# Patient Record
Sex: FEMALE | Race: BLACK OR AFRICAN AMERICAN | NOT HISPANIC OR LATINO | Employment: FULL TIME | URBAN - METROPOLITAN AREA
[De-identification: names, ages, dates, MRNs, and addresses within clinical notes are randomized per-mention and may not be internally consistent; named-entity substitution may affect disease eponyms.]

---

## 2018-07-04 ENCOUNTER — OFFICE VISIT (OUTPATIENT)
Dept: URGENT CARE | Age: 23
End: 2018-07-04
Payer: COMMERCIAL

## 2018-07-04 VITALS
WEIGHT: 277 LBS | TEMPERATURE: 98.2 F | DIASTOLIC BLOOD PRESSURE: 85 MMHG | SYSTOLIC BLOOD PRESSURE: 136 MMHG | HEIGHT: 71 IN | OXYGEN SATURATION: 96 % | RESPIRATION RATE: 16 BRPM | HEART RATE: 87 BPM | BODY MASS INDEX: 38.78 KG/M2

## 2018-07-04 DIAGNOSIS — H57.13 EYE PAIN, BILATERAL: ICD-10-CM

## 2018-07-04 DIAGNOSIS — S05.02XA BILATERAL CORNEAL ABRASIONS, INITIAL ENCOUNTER: Primary | ICD-10-CM

## 2018-07-04 DIAGNOSIS — S05.01XA BILATERAL CORNEAL ABRASIONS, INITIAL ENCOUNTER: Primary | ICD-10-CM

## 2018-07-04 PROCEDURE — 99213 OFFICE O/P EST LOW 20 MIN: CPT | Performed by: PHYSICIAN ASSISTANT

## 2018-07-04 RX ORDER — PROPARACAINE HYDROCHLORIDE 5 MG/ML
1 SOLUTION/ DROPS OPHTHALMIC ONCE
Status: COMPLETED | OUTPATIENT
Start: 2018-07-04 | End: 2018-07-04

## 2018-07-04 RX ORDER — ALBUTEROL SULFATE 90 UG/1
2 AEROSOL, METERED RESPIRATORY (INHALATION) EVERY 4 HOURS PRN
COMMUNITY
Start: 2011-10-18 | End: 2021-08-07 | Stop reason: SDUPTHER

## 2018-07-04 RX ORDER — MONTELUKAST SODIUM 10 MG/1
1 TABLET ORAL
COMMUNITY
Start: 2011-10-11 | End: 2021-08-07 | Stop reason: SDUPTHER

## 2018-07-04 RX ORDER — LORATADINE 10 MG/1
1 TABLET ORAL
COMMUNITY
Start: 2014-03-17

## 2018-07-04 RX ORDER — ALBUTEROL SULFATE 0.63 MG/3ML
1 SOLUTION RESPIRATORY (INHALATION) EVERY 6 HOURS
COMMUNITY

## 2018-07-04 RX ORDER — OMEPRAZOLE 40 MG/1
40 CAPSULE, DELAYED RELEASE ORAL
COMMUNITY

## 2018-07-04 RX ADMIN — PROPARACAINE HYDROCHLORIDE 1 DROP: 5 SOLUTION/ DROPS OPHTHALMIC at 15:50

## 2018-07-04 NOTE — PATIENT INSTRUCTIONS
Use erythromycin eye ointment as prescribed   Apply cold compresses  Do not wear contacts during treatment  Avoid touching eyes  Wash hands frequently  Follow up with ophthalmologist if symptoms do not resolve  Follow up with PCP in 3-5 days  Proceed to  ER if symptoms worsen  Corneal Abrasion   WHAT YOU NEED TO KNOW:   A corneal abrasion is a scratch on the cornea of your eye  The cornea is the clear layer that covers the front of your eye  A small scratch may heal in 1 to 2 days  Deeper or larger scratches may take longer to heal         DISCHARGE INSTRUCTIONS:   Contact your healthcare provider if:   · Your eye pain or vision gets worse  · You have yellow or green drainage from your eye  · You have questions or concerns about your condition or care  Medicines:   · Medicines  may be given in the form of eyedrops or ointment to help prevent an eye infection  You may also be given eye drops to decrease pain  Ask how to take this medicine safely  · Take your medicine as directed  Contact your healthcare provider if you think your medicine is not helping or if you have side effects  Tell him or her if you are allergic to any medicine  Keep a list of the medicines, vitamins, and herbs you take  Include the amounts, and when and why you take them  Bring the list or the pill bottles to follow-up visits  Carry your medicine list with you in case of an emergency  Follow up with your healthcare provider as directed:  Write down your questions so you remember to ask them during your visits  Self-care:   · Do not touch or rub your eye  · Ask your healthcare provider when you can start your normal activities  · Ask your healthcare provider when you can wear your contact lenses  · Wear sunglasses in bright light until your eyes feel better  Help prevent corneal abrasions:   · Remove your contact lenses if your eyes feel dry or irritated       · Wash your hands if you need to touch your eyes or your face  · Trim your child's fingernails so he cannot scratch his eye  · Wear protective eyewear when you work with chemicals, wood, dust, or metal      · Wear protective eyewear when you play sports  · Do not wear your contacts for longer than you should  · Do not wear colored lenses or lenses with shapes on them  These lenses may cause eye damage and vision loss  · Do not wear glitter makeup  Glitter can easily get into your eyes and under contact lenses  · Do not sleep with your contacts in your eyes  © 2017 [facility] Information is for End User's use only and may not be sold, redistributed or otherwise used for commercial purposes  All illustrations and images included in CareNotes® are the copyrighted property of [employer]  or Leobardo Aguilera  The above information is an  only  It is not intended as medical advice for individual conditions or treatments  Talk to your doctor, nurse or pharmacist before following any medical regimen to see if it is safe and effective for you

## 2018-07-04 NOTE — PROGRESS NOTES
[facility]        NAME: Hanna Pro is a 26 y o  female  : 1995    MRN: [id number]  DATE: 2018  TIME: 4:10 PM    Assessment and Plan   Bilateral corneal abrasions, initial encounter [S05  [address], [place]  1  Bilateral corneal abrasions, initial encounter     2  Eye pain, bilateral  fluorescein sodium sterile 1 mg ophthalmic strip 1 strip    proparacaine (ALCAINE) 0 5 % ophthalmic solution 1 drop         Patient Instructions     Use erythromycin eye ointment as prescribed   Apply cold compresses  Do not wear contacts during treatment  Avoid touching eyes  Wash hands frequently  Follow up with ophthalmologist if symptoms do not resolve  Follow up with PCP in 3-5 days  Proceed to  ER if symptoms worsen  Chief Complaint     Chief Complaint   Patient presents with    Eye Problem     this morning woke up with eye discharge and pain, eyedrops made it worse, also tried eyewash at work         History of Present Illness       PMH of allergies  Wears contacts but is not currently wearing  States she feels like there is "sand in my eye" but she knows there is nothing in there stating "the only thing I can think of is my eye makeup"  Eye Pain    Both eyes are affected  This is a new problem  The current episode started today  The problem occurs constantly  The problem has been gradually worsening  The pain is at a severity of 8/10  There is no known exposure to pink eye  She wears contacts  Associated symptoms include blurred vision, eye redness, a foreign body sensation and photophobia  Pertinent negatives include no eye discharge, double vision, fever, itching, nausea, recent URI or vomiting  She has tried eye drops and water for the symptoms  The treatment provided no relief  Review of Systems   Review of Systems   Constitutional: Negative for activity change, appetite change and fever  Eyes: Positive for blurred vision, photophobia, pain, redness and visual disturbance  Negative for double vision, discharge and itching  Gastrointestinal: Negative for nausea and vomiting  Neurological: Negative for headaches  Current Medications       Current Outpatient Prescriptions:     albuterol (ACCUNEB) 0 63 MG/3ML nebulizer solution, Inhale 1 ampule every 6 (six) hours, Disp: , Rfl:     albuterol (VENTOLIN HFA) 90 mcg/act inhaler, Inhale 2 puffs every 4 (four) hours as needed, Disp: , Rfl:     fluticasone-salmeterol (ADVAIR DISKUS) 250-50 mcg/dose inhaler, Inhale 1 puff every 12 (twelve) hours, Disp: , Rfl:     loratadine (CLARITIN) 10 mg tablet, Take 1 tablet by mouth, Disp: , Rfl:     montelukast (SINGULAIR) 10 mg tablet, Take 1 tablet by mouth, Disp: , Rfl:     omeprazole (PriLOSEC) 40 MG capsule, Take 40 mg by mouth, Disp: , Rfl:   No current facility-administered medications for this visit  Current Allergies     Allergies as of 07/04/2018 - never reviewed   Allergen Reaction Noted    Prednisone Other (See Comments) 08/26/2016            The following portions of the patient's history were reviewed and updated as appropriate: allergies, current medications, past family history, past medical history, past social history, past surgical history and problem list      History reviewed  No pertinent past medical history  History reviewed  No pertinent surgical history  No family history on file  Medications have been verified  Objective   /85 (BP Location: Right arm, Patient Position: Sitting, Cuff Size: Adult)   Pulse 87   Temp 98 2 °F (36 8 °C) (Tympanic)   Resp 16   Ht 5' 11" (1 803 m)   Wt 126 kg (277 lb)   LMP 06/20/2018 (Approximate)   SpO2 96%   BMI 38 63 kg/m²        Physical Exam     Physical Exam   Constitutional: She appears well-developed and well-nourished  No distress  HENT:   Head: Normocephalic     Right Ear: External ear normal    Left Ear: External ear normal    Nose: Nose normal    Mouth/Throat: Oropharynx is clear and moist  No oropharyngeal exudate  Eyes: Conjunctivae and EOM are normal  Pupils are equal, round, and reactive to light  Right eye exhibits no discharge  Left eye exhibits no discharge  No scleral icterus  Direct visualization without foreign body  Patient tearing throughout exam  OD, OS, OU 20/20  Fluoroscein stain performed with 1cm linear abrasion over R cornea from 3-6 o'clock  L 0 25cm scleral abrasion over 6 o'clock  Lid flipped without direct visualization of any foreign body  Cardiovascular: Normal rate, regular rhythm, normal heart sounds and intact distal pulses  Exam reveals no gallop and no friction rub  No murmur heard  Pulmonary/Chest: Effort normal and breath sounds normal  No respiratory distress  She has no wheezes  She has no rales  She exhibits no tenderness  Lymphadenopathy:     She has no cervical adenopathy  Skin: Skin is warm  She is not diaphoretic  Psychiatric: She has a normal mood and affect  Her behavior is normal  Judgment and thought content normal    Vitals reviewed

## 2019-04-08 ENCOUNTER — APPOINTMENT (EMERGENCY)
Dept: ULTRASOUND IMAGING | Facility: HOSPITAL | Age: 24
End: 2019-04-08
Payer: COMMERCIAL

## 2019-04-08 ENCOUNTER — HOSPITAL ENCOUNTER (EMERGENCY)
Facility: HOSPITAL | Age: 24
Discharge: HOME | End: 2019-04-08
Attending: EMERGENCY MEDICINE | Admitting: EMERGENCY MEDICINE
Payer: COMMERCIAL

## 2019-04-08 VITALS
TEMPERATURE: 97.7 F | HEART RATE: 73 BPM | WEIGHT: 279.98 LBS | BODY MASS INDEX: 39.05 KG/M2 | OXYGEN SATURATION: 100 % | SYSTOLIC BLOOD PRESSURE: 126 MMHG | DIASTOLIC BLOOD PRESSURE: 73 MMHG | RESPIRATION RATE: 18 BRPM

## 2019-04-08 DIAGNOSIS — R10.9 ABDOMINAL PAIN DURING PREGNANCY: ICD-10-CM

## 2019-04-08 DIAGNOSIS — O21.9 VOMITING DURING PREGNANCY: Primary | ICD-10-CM

## 2019-04-08 DIAGNOSIS — O26.899 ABDOMINAL PAIN DURING PREGNANCY: ICD-10-CM

## 2019-04-08 LAB
ANION GAP SERPL CALCULATED.3IONS-SCNC: 12 MMOL/L (ref 4–13)
B-HCG SERPL-ACNC: ABNORMAL MIU/ML
BACTERIA UR QL AUTO: ABNORMAL /HPF
BASOPHILS # BLD AUTO: 0.05 THOUSANDS/ΜL (ref 0–0.1)
BASOPHILS NFR BLD AUTO: 0 % (ref 0–1)
BILIRUB UR QL STRIP: NEGATIVE
BUN SERPL-MCNC: 6 MG/DL (ref 5–25)
CALCIUM SERPL-MCNC: 9.3 MG/DL (ref 8.3–10.1)
CHLORIDE SERPL-SCNC: 100 MMOL/L (ref 100–108)
CLARITY UR: CLEAR
CO2 SERPL-SCNC: 24 MMOL/L (ref 21–32)
COLOR UR: YELLOW
CREAT SERPL-MCNC: 0.66 MG/DL (ref 0.6–1.3)
EOSINOPHIL # BLD AUTO: 0.31 THOUSAND/ΜL (ref 0–0.61)
EOSINOPHIL NFR BLD AUTO: 3 % (ref 0–6)
ERYTHROCYTE [DISTWIDTH] IN BLOOD BY AUTOMATED COUNT: 11.9 % (ref 11.6–15.1)
EXT PREG TEST URINE: POSITIVE
GFR SERPL CREATININE-BSD FRML MDRD: 146 ML/MIN/1.73SQ M
GLUCOSE SERPL-MCNC: 91 MG/DL (ref 65–140)
GLUCOSE UR STRIP-MCNC: NEGATIVE MG/DL
HCT VFR BLD AUTO: 43.4 % (ref 34.8–46.1)
HGB BLD-MCNC: 14.6 G/DL (ref 11.5–15.4)
HGB UR QL STRIP.AUTO: NEGATIVE
IMM GRANULOCYTES # BLD AUTO: 0.04 THOUSAND/UL (ref 0–0.2)
IMM GRANULOCYTES NFR BLD AUTO: 0 % (ref 0–2)
KETONES UR STRIP-MCNC: ABNORMAL MG/DL
LEUKOCYTE ESTERASE UR QL STRIP: ABNORMAL
LYMPHOCYTES # BLD AUTO: 2.19 THOUSANDS/ΜL (ref 0.6–4.47)
LYMPHOCYTES NFR BLD AUTO: 18 % (ref 14–44)
MCH RBC QN AUTO: 31.4 PG (ref 26.8–34.3)
MCHC RBC AUTO-ENTMCNC: 33.6 G/DL (ref 31.4–37.4)
MCV RBC AUTO: 93 FL (ref 82–98)
MONOCYTES # BLD AUTO: 0.5 THOUSAND/ΜL (ref 0.17–1.22)
MONOCYTES NFR BLD AUTO: 4 % (ref 4–12)
NEUTROPHILS # BLD AUTO: 9.07 THOUSANDS/ΜL (ref 1.85–7.62)
NEUTS SEG NFR BLD AUTO: 75 % (ref 43–75)
NITRITE UR QL STRIP: NEGATIVE
NON-SQ EPI CELLS URNS QL MICRO: ABNORMAL /HPF
NRBC BLD AUTO-RTO: 0 /100 WBCS
PH UR STRIP.AUTO: 6.5 [PH] (ref 4.5–8)
PLATELET # BLD AUTO: 224 THOUSANDS/UL (ref 149–390)
PMV BLD AUTO: 10.1 FL (ref 8.9–12.7)
POTASSIUM SERPL-SCNC: 3.6 MMOL/L (ref 3.5–5.3)
PROT UR STRIP-MCNC: NEGATIVE MG/DL
RBC # BLD AUTO: 4.65 MILLION/UL (ref 3.81–5.12)
RBC #/AREA URNS AUTO: ABNORMAL /HPF
SODIUM SERPL-SCNC: 136 MMOL/L (ref 136–145)
SP GR UR STRIP.AUTO: 1.01 (ref 1–1.03)
UROBILINOGEN UR QL STRIP.AUTO: 0.2 E.U./DL
WBC # BLD AUTO: 12.16 THOUSAND/UL (ref 4.31–10.16)
WBC #/AREA URNS AUTO: ABNORMAL /HPF

## 2019-04-08 PROCEDURE — 76801 OB US < 14 WKS SINGLE FETUS: CPT

## 2019-04-08 PROCEDURE — 36415 COLL VENOUS BLD VENIPUNCTURE: CPT | Performed by: EMERGENCY MEDICINE

## 2019-04-08 PROCEDURE — 84702 CHORIONIC GONADOTROPIN TEST: CPT | Performed by: EMERGENCY MEDICINE

## 2019-04-08 PROCEDURE — 80048 BASIC METABOLIC PNL TOTAL CA: CPT | Performed by: EMERGENCY MEDICINE

## 2019-04-08 PROCEDURE — 96361 HYDRATE IV INFUSION ADD-ON: CPT

## 2019-04-08 PROCEDURE — 99284 EMERGENCY DEPT VISIT MOD MDM: CPT

## 2019-04-08 PROCEDURE — 81001 URINALYSIS AUTO W/SCOPE: CPT

## 2019-04-08 PROCEDURE — 99284 EMERGENCY DEPT VISIT MOD MDM: CPT | Performed by: EMERGENCY MEDICINE

## 2019-04-08 PROCEDURE — 96375 TX/PRO/DX INJ NEW DRUG ADDON: CPT

## 2019-04-08 PROCEDURE — 85025 COMPLETE CBC W/AUTO DIFF WBC: CPT | Performed by: EMERGENCY MEDICINE

## 2019-04-08 PROCEDURE — 81025 URINE PREGNANCY TEST: CPT

## 2019-04-08 PROCEDURE — 96374 THER/PROPH/DIAG INJ IV PUSH: CPT

## 2019-04-08 PROCEDURE — 87086 URINE CULTURE/COLONY COUNT: CPT

## 2019-04-08 RX ORDER — ONDANSETRON 2 MG/ML
4 INJECTION INTRAMUSCULAR; INTRAVENOUS ONCE
Status: COMPLETED | OUTPATIENT
Start: 2019-04-08 | End: 2019-04-08

## 2019-04-08 RX ORDER — METOCLOPRAMIDE 10 MG/1
10 TABLET ORAL EVERY 6 HOURS PRN
Qty: 15 TABLET | Refills: 0 | Status: SHIPPED | OUTPATIENT
Start: 2019-04-08 | End: 2019-04-08 | Stop reason: SDUPTHER

## 2019-04-08 RX ORDER — PYRIDOXINE HCL (VITAMIN B6) 25 MG
25 TABLET ORAL
Qty: 30 TABLET | Refills: 0 | Status: SHIPPED | OUTPATIENT
Start: 2019-04-08

## 2019-04-08 RX ORDER — METOCLOPRAMIDE HYDROCHLORIDE 5 MG/ML
10 INJECTION INTRAMUSCULAR; INTRAVENOUS ONCE
Status: COMPLETED | OUTPATIENT
Start: 2019-04-08 | End: 2019-04-08

## 2019-04-08 RX ORDER — PYRIDOXINE HCL (VITAMIN B6) 25 MG
25 TABLET ORAL
Qty: 30 TABLET | Refills: 0 | Status: SHIPPED | OUTPATIENT
Start: 2019-04-08 | End: 2019-04-08 | Stop reason: SDUPTHER

## 2019-04-08 RX ORDER — ONDANSETRON 4 MG/1
4 TABLET, ORALLY DISINTEGRATING ORAL ONCE
Status: DISCONTINUED | OUTPATIENT
Start: 2019-04-08 | End: 2019-04-08

## 2019-04-08 RX ORDER — METOCLOPRAMIDE 10 MG/1
10 TABLET ORAL EVERY 6 HOURS PRN
Qty: 15 TABLET | Refills: 0 | Status: SHIPPED | OUTPATIENT
Start: 2019-04-08

## 2019-04-08 RX ADMIN — METOCLOPRAMIDE 10 MG: 5 INJECTION, SOLUTION INTRAMUSCULAR; INTRAVENOUS at 17:06

## 2019-04-08 RX ADMIN — SODIUM CHLORIDE 1000 ML: 0.9 INJECTION, SOLUTION INTRAVENOUS at 16:24

## 2019-04-08 RX ADMIN — ONDANSETRON 4 MG: 2 INJECTION INTRAMUSCULAR; INTRAVENOUS at 18:58

## 2019-04-09 LAB — BACTERIA UR CULT: NORMAL

## 2019-08-13 ENCOUNTER — APPOINTMENT (OUTPATIENT)
Dept: RADIOLOGY | Age: 24
End: 2019-08-13
Payer: OTHER MISCELLANEOUS

## 2019-08-13 ENCOUNTER — OCCMED (OUTPATIENT)
Dept: URGENT CARE | Age: 24
End: 2019-08-13
Payer: OTHER MISCELLANEOUS

## 2019-08-13 DIAGNOSIS — S69.91XA INJURY OF RIGHT HAND, INITIAL ENCOUNTER: ICD-10-CM

## 2019-08-13 PROCEDURE — 73130 X-RAY EXAM OF HAND: CPT

## 2019-08-13 PROCEDURE — 99283 EMERGENCY DEPT VISIT LOW MDM: CPT | Performed by: PHYSICIAN ASSISTANT

## 2019-08-13 PROCEDURE — 90471 IMMUNIZATION ADMIN: CPT | Performed by: PHYSICIAN ASSISTANT

## 2019-08-13 PROCEDURE — G0382 LEV 3 HOSP TYPE B ED VISIT: HCPCS | Performed by: PHYSICIAN ASSISTANT

## 2020-01-17 ENCOUNTER — APPOINTMENT (EMERGENCY)
Dept: ULTRASOUND IMAGING | Facility: HOSPITAL | Age: 25
End: 2020-01-17
Payer: COMMERCIAL

## 2020-01-17 ENCOUNTER — HOSPITAL ENCOUNTER (EMERGENCY)
Facility: HOSPITAL | Age: 25
Discharge: HOME | End: 2020-01-17
Attending: EMERGENCY MEDICINE | Admitting: EMERGENCY MEDICINE
Payer: COMMERCIAL

## 2020-01-17 VITALS
OXYGEN SATURATION: 97 % | RESPIRATION RATE: 18 BRPM | WEIGHT: 287.7 LBS | BODY MASS INDEX: 40.13 KG/M2 | TEMPERATURE: 98.3 F | HEART RATE: 85 BPM | SYSTOLIC BLOOD PRESSURE: 138 MMHG | DIASTOLIC BLOOD PRESSURE: 61 MMHG

## 2020-01-17 DIAGNOSIS — Z3A.10 10 WEEKS GESTATION OF PREGNANCY: Primary | ICD-10-CM

## 2020-01-17 DIAGNOSIS — Z71.1 CONCERN ABOUT CURRENT PREGNANCY WITHOUT DIAGNOSIS: ICD-10-CM

## 2020-01-17 LAB
ABO GROUP BLD: NORMAL
B-HCG SERPL-ACNC: ABNORMAL MIU/ML
BASOPHILS # BLD AUTO: 0.04 THOUSANDS/ΜL (ref 0–0.1)
BASOPHILS NFR BLD AUTO: 0 % (ref 0–1)
EOSINOPHIL # BLD AUTO: 0.5 THOUSAND/ΜL (ref 0–0.61)
EOSINOPHIL NFR BLD AUTO: 5 % (ref 0–6)
ERYTHROCYTE [DISTWIDTH] IN BLOOD BY AUTOMATED COUNT: 11.5 % (ref 11.6–15.1)
HCT VFR BLD AUTO: 43 % (ref 34.8–46.1)
HGB BLD-MCNC: 14.2 G/DL (ref 11.5–15.4)
IMM GRANULOCYTES # BLD AUTO: 0.02 THOUSAND/UL (ref 0–0.2)
IMM GRANULOCYTES NFR BLD AUTO: 0 % (ref 0–2)
LYMPHOCYTES # BLD AUTO: 2 THOUSANDS/ΜL (ref 0.6–4.47)
LYMPHOCYTES NFR BLD AUTO: 21 % (ref 14–44)
MCH RBC QN AUTO: 31.3 PG (ref 26.8–34.3)
MCHC RBC AUTO-ENTMCNC: 33 G/DL (ref 31.4–37.4)
MCV RBC AUTO: 95 FL (ref 82–98)
MONOCYTES # BLD AUTO: 0.45 THOUSAND/ΜL (ref 0.17–1.22)
MONOCYTES NFR BLD AUTO: 5 % (ref 4–12)
NEUTROPHILS # BLD AUTO: 6.41 THOUSANDS/ΜL (ref 1.85–7.62)
NEUTS SEG NFR BLD AUTO: 69 % (ref 43–75)
NRBC BLD AUTO-RTO: 0 /100 WBCS
PLATELET # BLD AUTO: 183 THOUSANDS/UL (ref 149–390)
PMV BLD AUTO: 10.7 FL (ref 8.9–12.7)
RBC # BLD AUTO: 4.54 MILLION/UL (ref 3.81–5.12)
RH BLD: POSITIVE
WBC # BLD AUTO: 9.42 THOUSAND/UL (ref 4.31–10.16)

## 2020-01-17 PROCEDURE — 84702 CHORIONIC GONADOTROPIN TEST: CPT | Performed by: EMERGENCY MEDICINE

## 2020-01-17 PROCEDURE — 86901 BLOOD TYPING SEROLOGIC RH(D): CPT | Performed by: EMERGENCY MEDICINE

## 2020-01-17 PROCEDURE — 85025 COMPLETE CBC W/AUTO DIFF WBC: CPT | Performed by: EMERGENCY MEDICINE

## 2020-01-17 PROCEDURE — 99284 EMERGENCY DEPT VISIT MOD MDM: CPT | Performed by: EMERGENCY MEDICINE

## 2020-01-17 PROCEDURE — 86900 BLOOD TYPING SEROLOGIC ABO: CPT | Performed by: EMERGENCY MEDICINE

## 2020-01-17 PROCEDURE — 36415 COLL VENOUS BLD VENIPUNCTURE: CPT | Performed by: EMERGENCY MEDICINE

## 2020-01-17 PROCEDURE — 99284 EMERGENCY DEPT VISIT MOD MDM: CPT

## 2020-01-17 PROCEDURE — 76801 OB US < 14 WKS SINGLE FETUS: CPT

## 2020-01-17 NOTE — ED PROVIDER NOTES
Emergency Department Note- Lelo Fields 27 y o  female MRN: [id number]    Unit/Bed#: ED 15 Encounter: [id number]        History of Present Illness     Patient is a 53-year-old female, currently a 9 weeks 0 day  020 based on last menstrual period of November 15, 2019  She says she is not taking fertility treatments or attempting to get pregnant, but had some brownish discharge and some vomiting and abdominal cramping, like her prior pregnancies  [facility]  She says she had a positive urine pregnancy test, She was given a list of Ob referrals, and is not call anyone to make an appointment for follow-up yet  She presents today because with her prior pregnancies her nausea and vomiting and discharge has lasted much longer  She has not had any abdominal pain, cramping, nausea or vomiting or vaginal bleeding or brown drainage discharge in the last 1 week and she is worried that something is wrong because it always lasts longer  She currently has no fever, no chills, no nausea or vomiting no abdominal pain, no dysuria, no hematuria, no vaginal bleeding or discharge  When the discharge was there she said it was somewhat brown like old blood  Not bright red blood, not foul or malodorous  REVIEW OF SYSTEMS     Constitutional:  No recent weight  gains or losses   Eyes:  No visual changes   ENT:  No tinnitus or hearing changes   Cardiac: No chest pain or palpitations   Respiratory:  No cough or shortness of breath   Abdominal:  As per HPI   Urinary: No dysuria or hematuria   Hematologic: No easy bruising or bleeding   Skin: No rash   Musculoskeletal: No aches or pains   Neurologic: No weakness or sensory changes   Psychiatric: No mood changes      Historical Information   Past Medical History:   Diagnosis Date    Asthma      No past surgical history on file    Social History   Social History     Substance and Sexual Activity   Alcohol Use Never    Frequency: Never     Social History     Substance and Sexual Activity   Drug Use Never     Social History     Tobacco Use   Smoking Status Never Smoker   Smokeless Tobacco Never Used     Family History: No family history on file  MEDICATIONS:  No current facility-administered medications on file prior to encounter  Current Outpatient Medications on File Prior to Encounter   Medication Sig Dispense Refill    albuterol (ACCUNEB) 0 63 MG/3ML nebulizer solution Inhale 1 ampule every 6 (six) hours      albuterol (VENTOLIN HFA) 90 mcg/act inhaler Inhale 2 puffs every 4 (four) hours as needed      doxylamine (UNISON) 25 MG tablet Take 1 tablet (25 mg total) by mouth daily at bedtime as needed for nausea 30 tablet 0    fluticasone-salmeterol (ADVAIR DISKUS) 250-50 mcg/dose inhaler Inhale 1 puff every 12 (twelve) hours      loratadine (CLARITIN) 10 mg tablet Take 1 tablet by mouth      metoclopramide (REGLAN) 10 mg tablet Take 1 tablet (10 mg total) by mouth every 6 (six) hours as needed (nausea, vomiting) 15 tablet 0    montelukast (SINGULAIR) 10 mg tablet Take 1 tablet by mouth      omeprazole (PriLOSEC) 40 MG capsule Take 40 mg by mouth      pyridoxine (B-6) 25 MG tablet Take 1 tablet (25 mg total) by mouth daily at bedtime as needed (nausea/vomiting) 30 tablet 0     ALLERGIES:  Allergies   Allergen Reactions    Prednisone Other (See Comments)     Hallucinating, anger problems       Vitals: Blood pressure 138/61, pulse 85, temperature 98 3 °F (36 8 °C), temperature source Temporal, resp  rate 18, weight 130 kg (287 lb 11 2 oz), last menstrual period 11/15/2019, SpO2 97 %      PHYSICAL EXAM    General:  Patient is well-appearing  Head:  Atraumatic  Eyes:  Conjunctiva pink  ENT:  Mucous membranes are moist  Neck:  Supple  Cardiac:  S1-S2, without murmurs  Lungs:  Clear to auscultation bilaterally  Abdomen:  Soft, nontender, normal bowel sounds, no CVA tenderness, no tympany, no rigidity, no guarding  Gyn: Pelvic exam performed by me with female nurse Justa Rivas as a chaperone shows no evidence of external vaginal lesions  Speculum examination is no vaginal bleeding, no vaginal discharge, no malodor  Bimanual examination is no cervical motion tenderness, no adnexal tenderness, no masses  Extremities:  Normal range of motion  Neurologic:  Awake, fluent speech, normal comprehension  AAOx3     Skin:  Pink warm and dry  Psychiatric:  Alert, pleasant, cooperative            Labs Reviewed   CBC AND DIFFERENTIAL - Abnormal       Result Value Ref Range Status    WBC 9 42  4 31 - 10 16 Thousand/uL Final    RBC 4 54  3 81 - 5 12 Million/uL Final    Hemoglobin 14 2  11 5 - 15 4 g/dL Final    Hematocrit 43 0  34 8 - 46 1 % Final    MCV 95  82 - 98 fL Final    MCH 31 3  26 8 - 34 3 pg Final    MCHC 33 0  31 4 - 37 4 g/dL Final    RDW 11 5 (*) 11 6 - 15 1 % Final    MPV 10 7  8 9 - 12 7 fL Final    Platelets 976  281 - 390 Thousands/uL Final    nRBC 0  /100 WBCs Final    Neutrophils Relative 69  43 - 75 % Final    Immat GRANS % 0  0 - 2 % Final    Lymphocytes Relative 21  14 - 44 % Final    Monocytes Relative 5  4 - 12 % Final    Eosinophils Relative 5  0 - 6 % Final    Basophils Relative 0  0 - 1 % Final    Neutrophils Absolute 6 41  1 85 - 7 62 Thousands/µL Final    Immature Grans Absolute 0 02  0 00 - 0 20 Thousand/uL Final    Lymphocytes Absolute 2 00  0 60 - 4 47 Thousands/µL Final    Monocytes Absolute 0 45  0 17 - 1 22 Thousand/µL Final    Eosinophils Absolute 0 50  0 00 - 0 61 Thousand/µL Final    Basophils Absolute 0 04  0 00 - 0 10 Thousands/µL Final   HCG, QUANTITATIVE - Abnormal    HCG, Quant 85,158 0 (*) <=6 mIU/mL Final    Comment: 15    Narrative:      Expected Ranges:     Approximate               Approximate HCG  Gestation age          Concentration ( mIU/mL)  _____________          ______________________   Rob Hill                      HCG values  0 2-1                       5-50  1-2                           2-3 100-5000  3-4                         500-70443  4-5                         1000-92262  5-6                         [id number]  6-8                         [id number]  8-12                        [id number]     ABO/RH    ABO Grouping A   Final    Rh Factor Positive   Final       Medications - No data to display    US OB < 14 weeks with transvaginal   Final Result      Single live intrauterine gestation at 10 weeks 3 days (range +/- 5)  ZIA of 8/11/2020  Workstation performed: [id number]             Vitals:    01/17/20 1309 01/17/20 1506 01/17/20 1706   BP: (!) 177/94 118/61 138/61   TempSrc: Temporal     Pulse: 95 87 85   Resp: 16 16 18   Patient Position - Orthostatic VS:  Lying Sitting   Temp: 98 3 °F (36 8 °C)         ED Course as of Jan 17 1710 Fri Jan 17, 2020   1704 On reassessment there was no change from the above physical exam findings  Patient says she was fine      56 Discussed with her about need for following up with OB, either with [facility] that I referred her to or [facility]  Supportive care, importance of follow-up and return precautions were discussed with the patient  Patient expressed understanding  Assessment/Plan     ED Medical Decision Making:        Time reflects when diagnosis was documented in both MDM as applicable and the Disposition within this note     Time User Action Codes Description Comment    1/17/2020  5:06 PM Derril Pain Add [Z3A 10] 10 weeks gestation of pregnancy     1/17/2020  5:07 PM Derril Pain Add [Z71 1] Concern about current pregnancy without diagnosis       ED Disposition     ED Disposition Condition Date/Time Comment    Discharge Stable Fri Jan 17, 2020  5:06 PM David Hilario discharge to home/self care              Follow-up Information     Follow up With Specialties Details Why Contact Info Additional [address] [place] [address] and Gynecology Schedule an appointment as soon as possible for a visit in 3 days  [address] [facility] [address] [facility] [address]  [facility], [address] [place] [address], [id number] [phone]          New Prescriptions    No medications on file            Luz Farr DO  01/17/20 2644

## 2020-01-17 NOTE — DISCHARGE INSTRUCTIONS
Pregnancy   DISCHARGE INSTRUCTIONS:   Return to the emergency department if:   You develop a severe headache that does not go away  You have new or increased vision changes, such as blurred or spotted vision  You have new or increased swelling in your face or hands  You have pain or cramping in your abdomen or low back  You have vaginal bleeding  You have abdominal cramps, pressure, or tightening  You cannot keep food or drinks down, and you are losing weight      You are concerned about anything else

## 2020-01-28 ENCOUNTER — HOSPITAL ENCOUNTER (EMERGENCY)
Facility: HOSPITAL | Age: 25
Discharge: HOME | End: 2020-01-28
Attending: EMERGENCY MEDICINE
Payer: COMMERCIAL

## 2020-01-28 VITALS
OXYGEN SATURATION: 97 % | DIASTOLIC BLOOD PRESSURE: 60 MMHG | WEIGHT: 285.5 LBS | BODY MASS INDEX: 39.82 KG/M2 | HEART RATE: 104 BPM | SYSTOLIC BLOOD PRESSURE: 149 MMHG | TEMPERATURE: 98.4 F | RESPIRATION RATE: 16 BRPM

## 2020-01-28 DIAGNOSIS — J45.901 ASTHMA EXACERBATION: ICD-10-CM

## 2020-01-28 DIAGNOSIS — J02.9 PHARYNGITIS: Primary | ICD-10-CM

## 2020-01-28 LAB — S PYO DNA THROAT QL NAA+PROBE: NORMAL

## 2020-01-28 PROCEDURE — 87651 STREP A DNA AMP PROBE: CPT | Performed by: PHYSICIAN ASSISTANT

## 2020-01-28 PROCEDURE — 99284 EMERGENCY DEPT VISIT MOD MDM: CPT | Performed by: EMERGENCY MEDICINE

## 2020-01-28 PROCEDURE — 94640 AIRWAY INHALATION TREATMENT: CPT

## 2020-01-28 PROCEDURE — 99283 EMERGENCY DEPT VISIT LOW MDM: CPT

## 2020-01-28 RX ORDER — ALBUTEROL SULFATE 2.5 MG/3ML
5 SOLUTION RESPIRATORY (INHALATION) ONCE
Status: COMPLETED | OUTPATIENT
Start: 2020-01-28 | End: 2020-01-28

## 2020-01-28 RX ADMIN — IPRATROPIUM BROMIDE 0.5 MG: 0.5 SOLUTION RESPIRATORY (INHALATION) at 17:21

## 2020-01-28 RX ADMIN — ALBUTEROL SULFATE 5 MG: 2.5 SOLUTION RESPIRATORY (INHALATION) at 17:20

## 2020-01-29 NOTE — ED PROVIDER NOTES
History  Chief Complaint   Patient presents with    Sore Throat     Reports sore throat, subjective fevers, and headaches since last night, last dose Tylenol 150      77-year-old female presents emergency department complaining of a sore throat and viral URI symptoms and headaches that began last night  Patient also reports worsening of her asthma symptoms as well  She is well-appearing in no acute distress  Likely recent sick exposures  Allergies reviewed          Prior to Admission Medications   Prescriptions Last Dose Informant Patient Reported? Taking? albuterol (ACCUNEB) 0 63 MG/3ML nebulizer solution   Yes No   Sig: Inhale 1 ampule every 6 (six) hours   albuterol (VENTOLIN HFA) 90 mcg/act inhaler   Yes No   Sig: Inhale 2 puffs every 4 (four) hours as needed   doxylamine (UNISON) 25 MG tablet   No No   Sig: Take 1 tablet (25 mg total) by mouth daily at bedtime as needed for nausea   fluticasone-salmeterol (ADVAIR DISKUS) 250-50 mcg/dose inhaler   Yes No   Sig: Inhale 1 puff every 12 (twelve) hours   loratadine (CLARITIN) 10 mg tablet   Yes No   Sig: Take 1 tablet by mouth   metoclopramide (REGLAN) 10 mg tablet   No No   Sig: Take 1 tablet (10 mg total) by mouth every 6 (six) hours as needed (nausea, vomiting)   montelukast (SINGULAIR) 10 mg tablet   Yes No   Sig: Take 1 tablet by mouth   omeprazole (PriLOSEC) 40 MG capsule   Yes No   Sig: Take 40 mg by mouth   pyridoxine (B-6) 25 MG tablet   No No   Sig: Take 1 tablet (25 mg total) by mouth daily at bedtime as needed (nausea/vomiting)      Facility-Administered Medications: None       Past Medical History:   Diagnosis Date    Asthma        History reviewed  No pertinent surgical history  History reviewed  No pertinent family history  I have reviewed and agree with the history as documented      Social History     Tobacco Use    Smoking status: Never Smoker    Smokeless tobacco: Never Used   Substance Use Topics    Alcohol use: Never Frequency: Never    Drug use: Never        Review of Systems   HENT: Positive for congestion and sore throat  Respiratory: Positive for cough  Neurological: Positive for headaches  All other systems reviewed and are negative  Physical Exam  Physical Exam   Constitutional: She is oriented to person, place, and time  Vital signs are normal  She appears well-developed and well-nourished  She does not appear ill  No distress  HENT:   Head: Normocephalic and atraumatic  Right Ear: Hearing, tympanic membrane, external ear and ear canal normal    Left Ear: Hearing, tympanic membrane, external ear and ear canal normal    Nose: Nose normal    Mouth/Throat: Oropharynx is clear and moist and mucous membranes are normal  Tonsils are 0 on the right  Tonsils are 0 on the left  No tonsillar exudate  Eyes: Pupils are equal, round, and reactive to light  Conjunctivae and EOM are normal    Neck: Normal range of motion  Cardiovascular: Normal rate, regular rhythm, normal heart sounds and intact distal pulses  Exam reveals no gallop and no friction rub  No murmur heard  Pulmonary/Chest: Effort normal  No stridor  No respiratory distress  She has wheezes  She has no rales  Mild wheezing auscultated throughout   Abdominal: Soft  Bowel sounds are normal  She exhibits no distension  There is no tenderness  There is no guarding  Musculoskeletal: Normal range of motion  She exhibits no tenderness  Neurological: She is alert and oriented to person, place, and time  Skin: Skin is warm  Capillary refill takes less than 2 seconds  She is not diaphoretic  Nursing note and vitals reviewed        Vital Signs  ED Triage Vitals [01/28/20 1629]   Temperature Pulse Respirations Blood Pressure SpO2   98 4 °F (36 9 °C) 104 16 149/60 97 %      Temp Source Heart Rate Source Patient Position - Orthostatic VS BP Location FiO2 (%)   Oral Monitor Sitting Right arm --      Pain Score       6           Vitals:    01/28/20 1629 BP: 149/60   Pulse: 104   Patient Position - Orthostatic VS: Sitting         Visual Acuity      ED Medications  Medications   ipratropium (ATROVENT) 0 02 % inhalation solution 0 5 mg (0 5 mg Nebulization Given 1/28/20 1721)   albuterol inhalation solution 5 mg (5 mg Nebulization Given 1/28/20 1720)       Diagnostic Studies  Results Reviewed     Procedure Component Value Units Date/Time    Strep A PCR [[id number]]  (Normal) Collected:  01/28/20 1720    Lab Status:  Final result Specimen:  Throat Updated:  01/28/20 1758     STREP A PCR None Detected                 No orders to display              Procedures  Procedures         ED Course  ED Course as of Jan 30 1528   Tue Jan 28, 2020   1753 Wheezing improving  MDM  Number of Diagnoses or Management Options  Asthma exacerbation: minor  Pharyngitis: minor  Diagnosis management comments: Patient reports feeling improved after treatment in the emergency department  She is well-appearing in no acute distress  Advised follow-up with PCP if symptoms persist   Recommended over-the-counter medications for symptomatic relief  Patient educated regarding their diagnosis and given return and follow-up instructions  Patient is understanding and in agreement with the treatment plan  There are no questions at the time of discharge      Risk of Complications, Morbidity, and/or Mortality  Presenting problems: low  Diagnostic procedures: low  Management options: low    Patient Progress  Patient progress: stable        Disposition  Final diagnoses:   Pharyngitis   Asthma exacerbation     Time reflects when diagnosis was documented in both MDM as applicable and the Disposition within this note     Time User Action Codes Description Comment    1/28/2020  5:01 PM James Oliveira [J02 9] Pharyngitis     1/28/2020  5:01 PM James Oliveira [J45 901] Asthma exacerbation       ED Disposition     ED Disposition Condition Date/Time Comment Discharge Stable Tue Jan 28, 2020  6:18 PM Robin Espinoza discharge to home/self care  Follow-up Information    None         Discharge Medication List as of 1/28/2020  6:19 PM      CONTINUE these medications which have NOT CHANGED    Details   albuterol (ACCUNEB) 0 63 MG/3ML nebulizer solution Inhale 1 ampule every 6 (six) hours, Historical Med      albuterol (VENTOLIN HFA) 90 mcg/act inhaler Inhale 2 puffs every 4 (four) hours as needed, Starting Tue 10/18/2011, Historical Med      doxylamine (UNISON) 25 MG tablet Take 1 tablet (25 mg total) by mouth daily at bedtime as needed for nausea, Starting Mon 4/8/2019, Normal      fluticasone-salmeterol (ADVAIR DISKUS) 250-50 mcg/dose inhaler Inhale 1 puff every 12 (twelve) hours, Starting Tue 10/18/2011, Historical Med      loratadine (CLARITIN) 10 mg tablet Take 1 tablet by mouth, Starting Mon 3/17/2014, Historical Med      metoclopramide (REGLAN) 10 mg tablet Take 1 tablet (10 mg total) by mouth every 6 (six) hours as needed (nausea, vomiting), Starting Mon 4/8/2019, Normal      montelukast (SINGULAIR) 10 mg tablet Take 1 tablet by mouth, Starting Tue 10/11/2011, Historical Med      omeprazole (PriLOSEC) 40 MG capsule Take 40 mg by mouth, Historical Med      pyridoxine (B-6) 25 MG tablet Take 1 tablet (25 mg total) by mouth daily at bedtime as needed (nausea/vomiting), Starting Mon 4/8/2019, Print           No discharge procedures on file      ED Provider  Electronically Signed by           Brett Jackman PA-C  01/30/20 2784

## 2021-08-04 ENCOUNTER — HOSPITAL ENCOUNTER (EMERGENCY)
Facility: HOSPITAL | Age: 26
Discharge: HOME | End: 2021-08-04
Attending: EMERGENCY MEDICINE | Admitting: EMERGENCY MEDICINE
Payer: COMMERCIAL

## 2021-08-04 VITALS
WEIGHT: 293 LBS | DIASTOLIC BLOOD PRESSURE: 69 MMHG | RESPIRATION RATE: 18 BRPM | SYSTOLIC BLOOD PRESSURE: 147 MMHG | HEART RATE: 78 BPM | OXYGEN SATURATION: 97 % | BODY MASS INDEX: 43.48 KG/M2 | TEMPERATURE: 98 F

## 2021-08-04 DIAGNOSIS — S06.0X9A CONCUSSION: ICD-10-CM

## 2021-08-04 DIAGNOSIS — R51.9 HEADACHE: ICD-10-CM

## 2021-08-04 DIAGNOSIS — S16.1XXA STRAIN OF NECK MUSCLE, INITIAL ENCOUNTER: ICD-10-CM

## 2021-08-04 DIAGNOSIS — S09.90XA CLOSED HEAD INJURY, INITIAL ENCOUNTER: Primary | ICD-10-CM

## 2021-08-04 PROCEDURE — 99284 EMERGENCY DEPT VISIT MOD MDM: CPT | Performed by: EMERGENCY MEDICINE

## 2021-08-04 PROCEDURE — 99283 EMERGENCY DEPT VISIT LOW MDM: CPT

## 2021-08-04 RX ORDER — METOCLOPRAMIDE 10 MG/1
10 TABLET ORAL ONCE
Status: COMPLETED | OUTPATIENT
Start: 2021-08-04 | End: 2021-08-04

## 2021-08-04 RX ORDER — ONDANSETRON 4 MG/1
4 TABLET, ORALLY DISINTEGRATING ORAL EVERY 6 HOURS PRN
Qty: 20 TABLET | Refills: 0 | Status: SHIPPED | OUTPATIENT
Start: 2021-08-04

## 2021-08-04 RX ORDER — IBUPROFEN 600 MG/1
600 TABLET ORAL EVERY 6 HOURS PRN
Qty: 30 TABLET | Refills: 0 | Status: SHIPPED | OUTPATIENT
Start: 2021-08-04

## 2021-08-04 RX ORDER — DIPHENHYDRAMINE HCL 25 MG
25 TABLET ORAL ONCE
Status: COMPLETED | OUTPATIENT
Start: 2021-08-04 | End: 2021-08-04

## 2021-08-04 RX ORDER — IBUPROFEN 400 MG/1
800 TABLET ORAL ONCE
Status: COMPLETED | OUTPATIENT
Start: 2021-08-04 | End: 2021-08-04

## 2021-08-04 RX ADMIN — DIPHENHYDRAMINE HCL 25 MG: 25 TABLET, FILM COATED ORAL at 02:34

## 2021-08-04 RX ADMIN — METOCLOPRAMIDE 10 MG: 10 TABLET ORAL at 02:35

## 2021-08-04 RX ADMIN — IBUPROFEN 800 MG: 400 TABLET ORAL at 02:35

## 2021-08-04 NOTE — Clinical Note
Snehal Alonso was seen and treated in our emergency department on 8/4/2021  Diagnosis:     Josh Alexis  may return to work on return date  She may return on this date: 08/06/2021         If you have any questions or concerns, please don't hesitate to call        Ignacio David , DO    ______________________________           _______________          _______________  Hospital Representative                              Date                                Time

## 2021-08-04 NOTE — ED PROVIDER NOTES
History  Chief Complaint   Patient presents with    Fall     States fell in shower yesterday hitting head and back of neck  Denies loc, denies thinners  Now has headaches, fatigue, and nausea  Patient presents for headache, neck pain after suffering from a fall yesterday in the shower  She states that last night she was in the shower when she slipped, fell backwards and hit the back of her head on a wall  No loss of consciousness  Today the patient has been feeling fatigued, nausea with an occipital headache that radiates to the front along with some lateral neck pain  History provided by:  Patient   used: No    Head Injury w/unknown LOC  Location:  Occipital  Time since incident:  1 day  Mechanism of injury: fall    Fall:     Fall occurred:  Standing (slipped in the shower )    Impact surface:  Wall    Point of impact:  Head and neck    Entrapped after fall: no    Pain details:     Quality:  Dull and throbbing    Radiates to: forehead  Severity:  Moderate    Timing:  Constant    Progression:  Waxing and waning  Chronicity:  New  Relieved by: Slightly by Tylenol p m  Tonight  Associated symptoms: headache, nausea and neck pain    Associated symptoms: no blurred vision, no difficulty breathing, no disorientation, no double vision, no focal weakness, no hearing loss, no loss of consciousness, no memory loss, no numbness, no tinnitus and no vomiting        Prior to Admission Medications   Prescriptions Last Dose Informant Patient Reported? Taking?    albuterol (ACCUNEB) 0 63 MG/3ML nebulizer solution   Yes No   Sig: Inhale 1 ampule every 6 (six) hours   albuterol (VENTOLIN HFA) 90 mcg/act inhaler   Yes No   Sig: Inhale 2 puffs every 4 (four) hours as needed   doxylamine (UNISON) 25 MG tablet   No No   Sig: Take 1 tablet (25 mg total) by mouth daily at bedtime as needed for nausea   fluticasone-salmeterol (ADVAIR DISKUS) 250-50 mcg/dose inhaler   Yes No   Sig: Inhale 1 puff every 12 (twelve) hours   loratadine (CLARITIN) 10 mg tablet   Yes No   Sig: Take 1 tablet by mouth   metoclopramide (REGLAN) 10 mg tablet   No No   Sig: Take 1 tablet (10 mg total) by mouth every 6 (six) hours as needed (nausea, vomiting)   montelukast (SINGULAIR) 10 mg tablet   Yes No   Sig: Take 1 tablet by mouth   omeprazole (PriLOSEC) 40 MG capsule   Yes No   Sig: Take 40 mg by mouth   pyridoxine (B-6) 25 MG tablet   No No   Sig: Take 1 tablet (25 mg total) by mouth daily at bedtime as needed (nausea/vomiting)      Facility-Administered Medications: None       Past Medical History:   Diagnosis Date    Asthma        History reviewed  No pertinent surgical history  History reviewed  No pertinent family history  I have reviewed and agree with the history as documented  E-Cigarette/Vaping     E-Cigarette/Vaping Substances     Social History     Tobacco Use    Smoking status: Never Smoker    Smokeless tobacco: Never Used   Substance Use Topics    Alcohol use: Never    Drug use: Never       Review of Systems   Constitutional: Positive for fatigue  Negative for chills and fever  HENT: Negative for congestion, facial swelling, hearing loss, rhinorrhea and tinnitus  Eyes: Negative for blurred vision, double vision, photophobia, discharge and visual disturbance  Respiratory: Negative for cough  Cardiovascular: Negative for chest pain  Gastrointestinal: Positive for nausea  Negative for vomiting  Musculoskeletal: Positive for neck pain and neck stiffness  Negative for back pain and gait problem  Skin: Negative for color change and rash  Allergic/Immunologic: Negative for immunocompromised state  Neurological: Positive for headaches  Negative for tremors, focal weakness, loss of consciousness, syncope, speech difficulty, weakness and numbness  Psychiatric/Behavioral: Negative for memory loss  All other systems reviewed and are negative        Physical Exam  Physical Exam  Vitals and nursing note reviewed  Constitutional:       General: She is not in acute distress  Appearance: She is well-developed  She is not ill-appearing, toxic-appearing or diaphoretic  HENT:      Head: Normocephalic and atraumatic  Right Ear: External ear normal       Left Ear: External ear normal       Nose: Nose normal  No congestion or rhinorrhea  Eyes:      General: No scleral icterus  Right eye: No discharge  Left eye: No discharge  Extraocular Movements:      Right eye: Nystagmus present  Left eye: Nystagmus present  Conjunctiva/sclera: Conjunctivae normal       Pupils: Pupils are equal, round, and reactive to light  Comments: Slight mild, fatigable nystagmus when looking right and left   Neck:      Trachea: No tracheal deviation  Cardiovascular:      Rate and Rhythm: Normal rate  Pulmonary:      Effort: Pulmonary effort is normal  No tachypnea, accessory muscle usage or respiratory distress  Breath sounds: No stridor  Abdominal:      General: There is no distension  Palpations: Abdomen is soft  Tenderness: There is no abdominal tenderness  There is no guarding  Musculoskeletal:      Cervical back: Tenderness present  No edema, deformity, erythema, lacerations, rigidity, bony tenderness or crepitus  Normal range of motion  Back:    Skin:     General: Skin is warm and dry  Neurological:      General: No focal deficit present  Mental Status: She is alert and oriented to person, place, and time  She is not disoriented  GCS: GCS eye subscore is 4  GCS verbal subscore is 5  GCS motor subscore is 6  Cranial Nerves: No cranial nerve deficit, dysarthria or facial asymmetry  Coordination: Romberg sign negative   Coordination normal       Gait: Gait normal    Psychiatric:         Speech: Speech normal          Behavior: Behavior normal          Vital Signs  ED Triage Vitals [08/04/21 0209]   Temperature Pulse Respirations Blood Pressure SpO2 98 °F (36 7 °C) 78 18 147/69 97 %      Temp Source Heart Rate Source Patient Position - Orthostatic VS BP Location FiO2 (%)   Oral Monitor Sitting Right arm --      Pain Score       --           Vitals:    08/04/21 0209   BP: 147/69   Pulse: 78   Patient Position - Orthostatic VS: Sitting         Visual Acuity      ED Medications  Medications   diphenhydrAMINE (BENADRYL) tablet 25 mg (25 mg Oral Given 8/4/21 0234)   ibuprofen (MOTRIN) tablet 800 mg (800 mg Oral Given 8/4/21 0235)   metoclopramide (REGLAN) tablet 10 mg (10 mg Oral Given 8/4/21 0235)       Diagnostic Studies  Results Reviewed     None                 No orders to display              Procedures  Procedures         ED Course                 Stroke Assessment     Row Name 08/04/21 0238             NIH Stroke Scale    Interval  Baseline      Level of Consciousness (1a )  0      LOC Questions (1b )  0      LOC Commands (1c )  0      Best Gaze (2 )  0      Visual (3 )  0      Facial Palsy (4 )  0      Motor Arm, Left (5a )  0      Motor Arm, Right (5b )  0      Motor Leg, Left (6a )  0      Motor Leg, Right (6b )  0      Limb Ataxia (7 )  0      Sensory (8 )  0      Best Language (9 )  0      Dysarthria (10 )  0      Extinction and Inattention (11 ) (Formerly Neglect)  0      Total  0                    SBIRT 20yo+      Most Recent Value   SBIRT (23 yo +)   In order to provide better care to our patients, we are screening all of our patients for alcohol and drug use  Would it be okay to ask you these screening questions? Yes Filed at: 08/04/2021 0270   Initial Alcohol Screen: US AUDIT-C    1  How often do you have a drink containing alcohol?  0 Filed at: 08/04/2021 0226   2  How many drinks containing alcohol do you have on a typical day you are drinking? 0 Filed at: 08/04/2021 0226   3b  FEMALE Any Age, or MALE 65+: How often do you have 4 or more drinks on one occassion?   0 Filed at: 08/04/2021 0226   Audit-C Score  0 Filed at: 08/04/2021 4345 RUSSELL: How many times in the past year have you    Used an illegal drug or used a prescription medication for non-medical reasons? Never Filed at: 08/04/2021 0226                    [facility]  Number of Diagnoses or Management Options  Closed head injury, initial encounter: new and requires workup  Concussion: new and requires workup  Headache: new and requires workup  Strain of neck muscle, initial encounter: new and requires workup  Diagnosis management comments: Patient presents with minor CHI and has a headache consistent with post-traumatic headache  There was no evidence of cranial or intracranial injury noted on CT of the head  The patient has been behaving normally and no notable altered mental status  Cheshire score of 15  The neurologic exam is normal  There is no clinical evidence to support intracranial injury or bleed  There is no c-spine pain or tenderness and no significant distracting injury to suggest associated cervical spine injury  Patient able to be cleared via [facility] rules:  1 ) GCS 15 within 2 hours of injury  2 ) There is no open or depressed skull fracture on physical exam   3 ) No signs of basilar skull fracture  4 ) There was not more than 1 episode of vomiting   5 ) There is no retrograde amnesia to greater than 30 minutes prior to the event  6 ) No dangerous mechanism (fall greater than 3 feet or struck as pedetrian)  7 ) Patient is less than 72years old and older than 16   8 ) Patient is not on anticoagulants           Amount and/or Complexity of Data Reviewed  Review and summarize past medical records: yes    Patient Progress  Patient progress: stable      Disposition  Final diagnoses:   Closed head injury, initial encounter   Concussion   Headache   Strain of neck muscle, initial encounter     Time reflects when diagnosis was documented in both MDM as applicable and the Disposition within this note     Time User Action Codes Description Comment    8/4/2021  2:29 AM Charly Acharya Add [Z29 56JH] Closed head injury, initial encounter     8/4/2021  2:29 AM Charly Acharya Add [S06 0X9A] Concussion     8/4/2021  2:29 AM Best PurlKarime Add [R51 9] Headache     8/4/2021  2:39 AM SmeriglioKarime Earnest Add Shannen Mariza  1XXA] Strain of neck muscle, initial encounter       ED Disposition     ED Disposition Condition Date/Time Comment    Discharge Stable Wed Aug 4, 2021  2:29 AM Jose Snell discharge to home/self care              Follow-up Information     Follow up With Specialties Details Why Contact Info Additional [address] [facility] Medicine Call in 1 week If symptoms persist [address], [address] [facility] [id number]  [address], [address], [address] [place], [place], [address] [facility] Emergency Department Emergency Medicine Go to  If symptoms worsen [facility] [id number]  [address] [place] [address] Emergency Department, [address] [place] [address], [facility]          Discharge Medication List as of 8/4/2021  2:31 AM      START taking these medications    Details   ibuprofen (MOTRIN) 600 mg tablet Take 1 tablet (600 mg total) by mouth every 6 (six) hours as needed for moderate pain, Starting Wed 8/4/2021, Print      ondansetron (ZOFRAN-ODT) 4 mg disintegrating tablet Take 1 tablet (4 mg total) by mouth every 6 (six) hours as needed for nausea or vomiting, Starting Wed 8/4/2021, Print         CONTINUE these medications which have NOT CHANGED    Details   albuterol (ACCUNEB) 0 63 MG/3ML nebulizer solution Inhale 1 ampule every 6 (six) hours, Historical Med      albuterol (VENTOLIN HFA) 90 mcg/act inhaler Inhale 2 puffs every 4 (four) hours as needed, Starting Tue 10/18/2011, Historical Med      doxylamine (UNISON) 25 MG tablet Take 1 tablet (25 mg total) by mouth daily at bedtime as needed for nausea, Starting Mon 4/8/2019, Normal      fluticasone-salmeterol (ADVAIR DISKUS) 250-50 mcg/dose inhaler Inhale 1 puff every 12 (twelve) hours, Starting Tue 10/18/2011, Historical Med      loratadine (CLARITIN) 10 mg tablet Take 1 tablet by mouth, Starting Mon 3/17/2014, Historical Med      metoclopramide (REGLAN) 10 mg tablet Take 1 tablet (10 mg total) by mouth every 6 (six) hours as needed (nausea, vomiting), Starting Mon 4/8/2019, Normal      montelukast (SINGULAIR) 10 mg tablet Take 1 tablet by mouth, Starting Tue 10/11/2011, Historical Med      omeprazole (PriLOSEC) 40 MG capsule Take 40 mg by mouth, Historical Med      pyridoxine (B-6) 25 MG tablet Take 1 tablet (25 mg total) by mouth daily at bedtime as needed (nausea/vomiting), Starting Mon 4/8/2019, Print           No discharge procedures on file      PDMP Review     None          ED Provider  Electronically Signed by           Vernon Grant DO  08/04/21 5388

## 2021-08-07 ENCOUNTER — HOSPITAL ENCOUNTER (EMERGENCY)
Facility: HOSPITAL | Age: 26
Discharge: HOME | End: 2021-08-07
Attending: EMERGENCY MEDICINE | Admitting: EMERGENCY MEDICINE
Payer: COMMERCIAL

## 2021-08-07 VITALS
RESPIRATION RATE: 22 BRPM | HEART RATE: 88 BPM | WEIGHT: 293 LBS | DIASTOLIC BLOOD PRESSURE: 85 MMHG | TEMPERATURE: 98.1 F | OXYGEN SATURATION: 95 % | SYSTOLIC BLOOD PRESSURE: 153 MMHG | BODY MASS INDEX: 43.51 KG/M2

## 2021-08-07 DIAGNOSIS — J45.901 ASTHMA EXACERBATION: Primary | ICD-10-CM

## 2021-08-07 LAB — SARS-COV-2 RNA RESP QL NAA+PROBE: NEGATIVE

## 2021-08-07 PROCEDURE — U0005 INFEC AGEN DETEC AMPLI PROBE: HCPCS | Performed by: EMERGENCY MEDICINE

## 2021-08-07 PROCEDURE — 99285 EMERGENCY DEPT VISIT HI MDM: CPT

## 2021-08-07 PROCEDURE — U0003 INFECTIOUS AGENT DETECTION BY NUCLEIC ACID (DNA OR RNA); SEVERE ACUTE RESPIRATORY SYNDROME CORONAVIRUS 2 (SARS-COV-2) (CORONAVIRUS DISEASE [COVID-19]), AMPLIFIED PROBE TECHNIQUE, MAKING USE OF HIGH THROUGHPUT TECHNOLOGIES AS DESCRIBED BY CMS-2020-01-R: HCPCS | Performed by: EMERGENCY MEDICINE

## 2021-08-07 PROCEDURE — 99284 EMERGENCY DEPT VISIT MOD MDM: CPT | Performed by: EMERGENCY MEDICINE

## 2021-08-07 RX ORDER — SODIUM CHLORIDE FOR INHALATION 0.9 %
12 VIAL, NEBULIZER (ML) INHALATION ONCE
Status: COMPLETED | OUTPATIENT
Start: 2021-08-07 | End: 2021-08-07

## 2021-08-07 RX ORDER — DEXAMETHASONE 4 MG/1
6 TABLET ORAL ONCE
Status: COMPLETED | OUTPATIENT
Start: 2021-08-07 | End: 2021-08-07

## 2021-08-07 RX ORDER — MONTELUKAST SODIUM 10 MG/1
10 TABLET ORAL
Qty: 30 TABLET | Refills: 0 | Status: SHIPPED | OUTPATIENT
Start: 2021-08-07 | End: 2021-09-06

## 2021-08-07 RX ORDER — ALBUTEROL SULFATE 90 UG/1
2 AEROSOL, METERED RESPIRATORY (INHALATION) EVERY 4 HOURS PRN
Qty: 18 G | Refills: 0 | Status: SHIPPED | OUTPATIENT
Start: 2021-08-07

## 2021-08-07 RX ADMIN — IPRATROPIUM BROMIDE 1 MG: 0.5 SOLUTION RESPIRATORY (INHALATION) at 06:25

## 2021-08-07 RX ADMIN — DEXAMETHASONE 6 MG: 4 TABLET ORAL at 06:21

## 2021-08-07 RX ADMIN — ALBUTEROL SULFATE 10 MG: 2.5 SOLUTION RESPIRATORY (INHALATION) at 06:25

## 2021-08-07 RX ADMIN — ISODIUM CHLORIDE 12 ML: 0.03 SOLUTION RESPIRATORY (INHALATION) at 06:25

## 2021-08-07 NOTE — ED PROVIDER NOTES
History  Chief Complaint   Patient presents with    Shortness of Breath     SOB ongoing the past week  states hx of asthma and currently without home meds  states feels like typical asthma but intensified  HPI  Trung Hills is an 27y o  year old female with PMHx significant for asthma who presents to the ED today with SOB  Patient has had dx of asthma since she was young and currently takes no medications for medical management of symptoms  Does not monitor peak flows at home  Patient has been hospitalized for asthma in the past but only as a young child and has never been intubated  Last course of steroids is unknown  Patient has PCP who prescribes asthma medications but she does not see them until 8/9  The patient denies fevers, chills, headaches, lightheadedness or syncope, nausea, vomiting, vision changes, hearing changes, congestion, abdominal pain, changes in usual bowel movements, changes with urination, back pain, numbness or tingling, rashes, pain anywhere else in body  The patient has no sick contacts, recent travel history, new or changing medications, or immunocompromise  Patient denies use of drugs or alcohol  No immunizations to covid  Prior to Admission Medications   Prescriptions Last Dose Informant Patient Reported? Taking?    albuterol (ACCUNEB) 0 63 MG/3ML nebulizer solution   Yes No   Sig: Inhale 1 ampule every 6 (six) hours   albuterol (VENTOLIN HFA) 90 mcg/act inhaler   Yes No   Sig: Inhale 2 puffs every 4 (four) hours as needed   albuterol (Ventolin HFA) 90 mcg/act inhaler   No No   Sig: Inhale 2 puffs every 4 (four) hours as needed for shortness of breath   doxylamine (UNISON) 25 MG tablet   No No   Sig: Take 1 tablet (25 mg total) by mouth daily at bedtime as needed for nausea   fluticasone-salmeterol (ADVAIR DISKUS) 250-50 mcg/dose inhaler   Yes No   Sig: Inhale 1 puff every 12 (twelve) hours   fluticasone-salmeterol (Advair Diskus) 250-50 mcg/dose inhaler   No No Sig: Inhale 1 puff every 12 (twelve) hours   ibuprofen (MOTRIN) 600 mg tablet   No No   Sig: Take 1 tablet (600 mg total) by mouth every 6 (six) hours as needed for moderate pain   loratadine (CLARITIN) 10 mg tablet   Yes No   Sig: Take 1 tablet by mouth   metoclopramide (REGLAN) 10 mg tablet   No No   Sig: Take 1 tablet (10 mg total) by mouth every 6 (six) hours as needed (nausea, vomiting)   montelukast (SINGULAIR) 10 mg tablet   Yes No   Sig: Take 1 tablet by mouth   montelukast (Singulair) 10 mg tablet   No No   Sig: Take 1 tablet (10 mg total) by mouth daily at bedtime   omeprazole (PriLOSEC) 40 MG capsule   Yes No   Sig: Take 40 mg by mouth   ondansetron (ZOFRAN-ODT) 4 mg disintegrating tablet   No No   Sig: Take 1 tablet (4 mg total) by mouth every 6 (six) hours as needed for nausea or vomiting   pyridoxine (B-6) 25 MG tablet   No No   Sig: Take 1 tablet (25 mg total) by mouth daily at bedtime as needed (nausea/vomiting)      Facility-Administered Medications: None       Past Medical History:   Diagnosis Date    Asthma        History reviewed  No pertinent surgical history  History reviewed  No pertinent family history  I have reviewed and agree with the history as documented  E-Cigarette/Vaping     E-Cigarette/Vaping Substances     Social History     Tobacco Use    Smoking status: Never Smoker    Smokeless tobacco: Never Used   Substance Use Topics    Alcohol use: Never    Drug use: Never        Review of Systems   All other systems reviewed and are negative        Physical Exam  ED Triage Vitals [08/07/21 0530]   Temperature Pulse Respirations Blood Pressure SpO2   98 1 °F (36 7 °C) 88 22 153/85 95 %      Temp Source Heart Rate Source Patient Position - Orthostatic VS BP Location FiO2 (%)   Oral Monitor Sitting Right arm --      Pain Score       No Pain             Orthostatic Vital Signs  Vitals:    08/07/21 0530   BP: 153/85   Pulse: 88   Patient Position - Orthostatic VS: Sitting Physical Exam   PHYSICAL EXAM    Constitutional:  Well developed, well nourished, no acute distress, non-toxic appearance    HEENT:  Conjunctiva normal  Oropharynx moist  Respiratory:  No respiratory distress, diffuse wheezing  Cardiovascular:  Normal rate, normal rhythm, no murmurs  GI:  Soft, obese, nondistended, normal bowel sounds, nontender  :  No costovertebral angle tenderness   Musculoskeletal:  No edema, no tenderness, no deformities  Integument:  Well hydrated, no rash   Lymphatic:  No lymphadenopathy noted   Neurologic:  Alert & oriented, normal motor function, normal sensory function, no focal deficits noted   Psychiatric:  Speech and behavior appropriate       ED Medications  Medications   albuterol inhalation solution 10 mg (10 mg Nebulization Given 8/7/21 0625)   ipratropium (ATROVENT) 0 02 % inhalation solution 1 mg (1 mg Nebulization Given 8/7/21 0625)   sodium chloride 0 9 % inhalation solution 12 mL (12 mL Nebulization Given 8/7/21 0625)   dexamethasone (DECADRON) tablet 6 mg (6 mg Oral Given 8/7/21 9084)       Diagnostic Studies  Results Reviewed     Procedure Component Value Units Date/Time    Novel Coronavirus (Covid-19),PCR [facility] - 24 Hour Routine [[id number]] Collected: 08/07/21 0738    Lab Status:  In process Specimen: Nares from Nasopharyngeal Swab Updated: 08/07/21 0741                 No orders to display         Procedures  CriticalCare Time  Performed by: Diana Fan MD  Authorized by: Diana Fan MD     Critical care provider statement:     Critical care time (minutes):  30    Critical care start time:  8/7/2021 6:20 AM    Critical care end time:  8/7/2021 6:50 AM    Critical care was necessary to treat or prevent imminent or life-threatening deterioration of the following conditions:  Respiratory failure    Critical care was time spent personally by me on the following activities:  Ordering and performing treatments and interventions and re-evaluation of patient's condition          ED Course                             SBIRT 20yo+      Most Recent Value   SBIRT (24 yo +)   In order to provide better care to our patients, we are screening all of our patients for alcohol and drug use  Would it be okay to ask you these screening questions? Yes Filed at: 08/07/2021 0620   Initial Alcohol Screen: US AUDIT-C    1  How often do you have a drink containing alcohol?  0 Filed at: 08/07/2021 0620   2  How many drinks containing alcohol do you have on a typical day you are drinking? 0 Filed at: 08/07/2021 0620   3b  FEMALE Any Age, or MALE 65+: How often do you have 4 or more drinks on one occassion? 0 Filed at: 08/07/2021 5156   Audit-C Score  0 Filed at: 08/07/2021 3257   RUSSELL: How many times in the past year have you    Used an illegal drug or used a prescription medication for non-medical reasons? Never Filed at: 08/07/2021 0620                MDM  Number of Diagnoses or Management Options  Asthma exacerbation  Diagnosis management comments: Akosua García is an 27y o  year old female with PMHx significant for asthma who presents to the ED today with SOB  Exam significant for diffuse wheezing  Oxygenating well on room air  Will treat with heart neb and Decadron  Will refill her asthma medications  On revaluation, patient improvement wheezing and stated she significantly better  Peak flow before and after treatment remained at 200  Patient discharged with peak flow monitor  COVID test obtained prior to discharge  Patient discharged home with medication refill    Discussed return precautions, patient reported understanding and all questions were answered       Amount and/or Complexity of Data Reviewed  Review and summarize past medical records: yes  Discuss the patient with other providers: yes    Risk of Complications, Morbidity, and/or Mortality  Presenting problems: moderate  Diagnostic procedures: low  Management options: moderate    Patient Progress  Patient progress: improved      Disposition  Final diagnoses:   Asthma exacerbation     Time reflects when diagnosis was documented in both MDM as applicable and the Disposition within this note     Time User Action Codes Description Comment    8/7/2021  7:07 AM Akil Oliveira [J45 [id number]] Asthma exacerbation       ED Disposition     ED Disposition Condition Date/Time Comment    Discharge Good Sat Aug 7, 2021  7:19 AM Philip Damian discharge to home/self care              Follow-up Information    None         Discharge Medication List as of 8/7/2021  7:19 AM      CONTINUE these medications which have CHANGED    Details   albuterol (Ventolin HFA) 90 mcg/act inhaler Inhale 2 puffs every 4 (four) hours as needed for shortness of breath, Starting Sat 8/7/2021, Normal      fluticasone-salmeterol (Advair Diskus) 250-50 mcg/dose inhaler Inhale 1 puff every 12 (twelve) hours, Starting Sat 8/7/2021, Until Mon 9/6/2021, Normal      montelukast (Singulair) 10 mg tablet Take 1 tablet (10 mg total) by mouth daily at bedtime, Starting Sat 8/7/2021, Until Mon 9/6/2021, Normal         CONTINUE these medications which have NOT CHANGED    Details   albuterol (ACCUNEB) 0 63 MG/3ML nebulizer solution Inhale 1 ampule every 6 (six) hours, Historical Med      doxylamine (UNISON) 25 MG tablet Take 1 tablet (25 mg total) by mouth daily at bedtime as needed for nausea, Starting Mon 4/8/2019, Normal      ibuprofen (MOTRIN) 600 mg tablet Take 1 tablet (600 mg total) by mouth every 6 (six) hours as needed for moderate pain, Starting Wed 8/4/2021, Print      loratadine (CLARITIN) 10 mg tablet Take 1 tablet by mouth, Starting Mon 3/17/2014, Historical Med      metoclopramide (REGLAN) 10 mg tablet Take 1 tablet (10 mg total) by mouth every 6 (six) hours as needed (nausea, vomiting), Starting Mon 4/8/2019, Normal      omeprazole (PriLOSEC) 40 MG capsule Take 40 mg by mouth, Historical Med      ondansetron (ZOFRAN-ODT) 4 mg disintegrating tablet Take 1 tablet (4 mg total) by mouth every 6 (six) hours as needed for nausea or vomiting, Starting Wed 8/4/2021, Print      pyridoxine (B-6) 25 MG tablet Take 1 tablet (25 mg total) by mouth daily at bedtime as needed (nausea/vomiting), Starting Mon 4/8/2019, Print           No discharge procedures on file  PDMP Review     None           ED Provider  Attending physically available and evaluated Montez Castillo  NIMA managed the patient along with the ED Attending      Electronically Signed by         Alysha Patino MD  08/07/21 9716

## 2021-08-07 NOTE — ED ATTENDING ATTESTATION
8/7/2021  ILeticia DO, saw and evaluated the patient  I have discussed the patient with the resident/non-physician practitioner and agree with the resident's/non-physician practitioner's findings, Plan of Care, and MDM as documented in the resident's/non-physician practitioner's note, except where noted  All available labs and Radiology studies were reviewed  I was present for key portions of any procedure(s) performed by the resident/non-physician practitioner and I was immediately available to provide assistance  At this point I agree with the current assessment done in the Emergency Department  I have conducted an independent evaluation of this patient a history and physical is as follows:    Patient presenting with acute asthma exacerbation  Patient appears in no distress  Decreased breath sounds with expiratory wheezes  Nebulized treatments and steroids given  Will reassess patient for clinical improvement  On reassessment after treatment with Nebulizer and steroid medication, the patient is moving air well and in no distress and oxygen saturation is within normal limits  There is no clinical evidence to suggest pneumonia at this time  Diagnosis, plan of care and management were discussed with the patient who agreed with plan and feels patient is better and ready to go home  They are to follow up with PCP within the next 1-2 weeks if symptoms are persisting but not as bad as today  The patient was instructed to return if the patient worsened in any way, worsening difficulty breathing or wheezing, persistent fever, irritability or discomfort, decreased activity responsiveness, inability to keep medication or adequate fluids down with or without vomiting, or as needed  Advised to avoid exposure to tobacco smoke, polluted air and other known asthma triggers  Monitor albuterol use to report back at follow up    Patient aware that increased albuterol use indicates poor control and may need further medication adjustment      ED Course         Critical Care Time  Procedures

## 2023-05-04 ENCOUNTER — OFFICE (OUTPATIENT)
Dept: URBAN - METROPOLITAN AREA CLINIC 10 | Facility: CLINIC | Age: 28
End: 2023-05-04

## 2023-05-04 VITALS
SYSTOLIC BLOOD PRESSURE: 123 MMHG | HEIGHT: 67 IN | HEART RATE: 67 BPM | WEIGHT: 226 LBS | DIASTOLIC BLOOD PRESSURE: 77 MMHG | OXYGEN SATURATION: 99 %

## 2023-05-04 DIAGNOSIS — K59.00 CONSTIPATION, UNSPECIFIED: ICD-10-CM

## 2023-05-04 DIAGNOSIS — R11.0 NAUSEA: ICD-10-CM

## 2023-05-04 DIAGNOSIS — K62.89 OTHER SPECIFIED DISEASES OF ANUS AND RECTUM: ICD-10-CM

## 2023-05-04 DIAGNOSIS — R10.9 UNSPECIFIED ABDOMINAL PAIN: ICD-10-CM

## 2023-05-04 DIAGNOSIS — K92.1 MELENA: ICD-10-CM

## 2023-05-04 PROCEDURE — 99204 OFFICE O/P NEW MOD 45 MIN: CPT

## 2023-05-04 RX ORDER — POLYETHYLENE GLYCOL 3350, SODIUM SULFATE, SODIUM CHLORIDE, POTASSIUM CHLORIDE, ASCORBIC ACID, SODIUM ASCORBATE 140-9-5.2G
KIT ORAL
Qty: 1 | Refills: 0 | Status: ACTIVE
Start: 2023-05-04

## 2023-05-11 ENCOUNTER — TELEPHONE (OUTPATIENT)
Dept: BARIATRICS | Facility: CLINIC | Age: 28
End: 2023-05-11

## 2023-11-09 ENCOUNTER — OFFICE (OUTPATIENT)
Dept: URBAN - METROPOLITAN AREA CLINIC 10 | Facility: CLINIC | Age: 28
End: 2023-11-09

## 2023-11-09 VITALS
DIASTOLIC BLOOD PRESSURE: 77 MMHG | HEIGHT: 67 IN | HEART RATE: 70 BPM | WEIGHT: 220 LBS | SYSTOLIC BLOOD PRESSURE: 139 MMHG | OXYGEN SATURATION: 98 %

## 2023-11-09 DIAGNOSIS — R10.84 GENERALIZED ABDOMINAL PAIN: ICD-10-CM

## 2023-11-09 DIAGNOSIS — R11.0 NAUSEA: ICD-10-CM

## 2023-11-09 DIAGNOSIS — R63.0 ANOREXIA: ICD-10-CM

## 2023-11-09 PROCEDURE — 99214 OFFICE O/P EST MOD 30 MIN: CPT

## 2023-11-28 LAB
H PYLORI BREATH TEST: NEGATIVE
H. PYLORI BREATH COLLECTION: (no result)

## 2024-02-12 ENCOUNTER — OFFICE (OUTPATIENT)
Dept: URBAN - METROPOLITAN AREA CLINIC 10 | Facility: CLINIC | Age: 29
End: 2024-02-12

## 2025-02-27 ENCOUNTER — APPOINTMENT (EMERGENCY)
Dept: RADIOLOGY | Facility: HOSPITAL | Age: 30
End: 2025-02-27
Payer: COMMERCIAL

## 2025-02-27 ENCOUNTER — HOSPITAL ENCOUNTER (EMERGENCY)
Facility: HOSPITAL | Age: 30
Discharge: HOME | End: 2025-02-27
Attending: EMERGENCY MEDICINE
Payer: COMMERCIAL

## 2025-02-27 VITALS
HEART RATE: 83 BPM | TEMPERATURE: 97.8 F | SYSTOLIC BLOOD PRESSURE: 142 MMHG | OXYGEN SATURATION: 98 % | DIASTOLIC BLOOD PRESSURE: 97 MMHG | WEIGHT: 293 LBS | BODY MASS INDEX: 45.23 KG/M2 | RESPIRATION RATE: 18 BRPM

## 2025-02-27 DIAGNOSIS — R06.02 SHORTNESS OF BREATH: Primary | ICD-10-CM

## 2025-02-27 LAB
ALBUMIN SERPL BCG-MCNC: 4.3 G/DL (ref 3.5–5)
ALP SERPL-CCNC: 48 U/L (ref 34–104)
ALT SERPL W P-5'-P-CCNC: 23 U/L (ref 7–52)
ANION GAP SERPL CALCULATED.3IONS-SCNC: 6 MMOL/L (ref 4–13)
AST SERPL W P-5'-P-CCNC: 15 U/L (ref 13–39)
ATRIAL RATE: 0 BPM
ATRIAL RATE: 83 BPM
BASOPHILS # BLD AUTO: 0.07 THOUSANDS/ÂΜL (ref 0–0.1)
BASOPHILS NFR BLD AUTO: 1 % (ref 0–1)
BILIRUB SERPL-MCNC: 0.57 MG/DL (ref 0.2–1)
BNP SERPL-MCNC: 37 PG/ML (ref 0–100)
BUN SERPL-MCNC: 8 MG/DL (ref 5–25)
CALCIUM SERPL-MCNC: 9.2 MG/DL (ref 8.4–10.2)
CARDIAC TROPONIN I PNL SERPL HS: <2 NG/L (ref ?–50)
CHLORIDE SERPL-SCNC: 106 MMOL/L (ref 96–108)
CO2 SERPL-SCNC: 26 MMOL/L (ref 21–32)
CREAT SERPL-MCNC: 0.71 MG/DL (ref 0.6–1.3)
D DIMER PPP FEU-MCNC: <0.27 UG/ML FEU
EOSINOPHIL # BLD AUTO: 0.5 THOUSAND/ÂΜL (ref 0–0.61)
EOSINOPHIL NFR BLD AUTO: 5 % (ref 0–6)
ERYTHROCYTE [DISTWIDTH] IN BLOOD BY AUTOMATED COUNT: 11.7 % (ref 11.6–15.1)
GFR SERPL CREATININE-BSD FRML MDRD: 117 ML/MIN/1.73SQ M
GLUCOSE SERPL-MCNC: 154 MG/DL (ref 65–140)
HCT VFR BLD AUTO: 43.5 % (ref 34.8–46.1)
HGB BLD-MCNC: 14.4 G/DL (ref 11.5–15.4)
IMM GRANULOCYTES # BLD AUTO: 0.03 THOUSAND/UL (ref 0–0.2)
IMM GRANULOCYTES NFR BLD AUTO: 0 % (ref 0–2)
LYMPHOCYTES # BLD AUTO: 2.24 THOUSANDS/ÂΜL (ref 0.6–4.47)
LYMPHOCYTES NFR BLD AUTO: 22 % (ref 14–44)
MCH RBC QN AUTO: 30.5 PG (ref 26.8–34.3)
MCHC RBC AUTO-ENTMCNC: 33.1 G/DL (ref 31.4–37.4)
MCV RBC AUTO: 92 FL (ref 82–98)
MONOCYTES # BLD AUTO: 0.38 THOUSAND/ÂΜL (ref 0.17–1.22)
MONOCYTES NFR BLD AUTO: 4 % (ref 4–12)
NEUTROPHILS # BLD AUTO: 7.14 THOUSANDS/ÂΜL (ref 1.85–7.62)
NEUTS SEG NFR BLD AUTO: 68 % (ref 43–75)
NRBC BLD AUTO-RTO: 0 /100 WBCS
P AXIS: 0 DEGREES
P AXIS: 48 DEGREES
PLATELET # BLD AUTO: 247 THOUSANDS/UL (ref 149–390)
PMV BLD AUTO: 10.5 FL (ref 8.9–12.7)
POTASSIUM SERPL-SCNC: 3.8 MMOL/L (ref 3.5–5.3)
PR INTERVAL: 152 MS
PROT SERPL-MCNC: 7.4 G/DL (ref 6.4–8.4)
QRS AXIS: 0 DEGREES
QRS AXIS: 40 DEGREES
QRSD INTERVAL: 0 MS
QRSD INTERVAL: 82 MS
QT INTERVAL: 0 MS
QT INTERVAL: 366 MS
QTC INTERVAL: 0 MS
QTC INTERVAL: 430 MS
RBC # BLD AUTO: 4.72 MILLION/UL (ref 3.81–5.12)
SODIUM SERPL-SCNC: 138 MMOL/L (ref 135–147)
T WAVE AXIS: 0 DEGREES
T WAVE AXIS: 65 DEGREES
VENTRICULAR RATE: 0 BPM
VENTRICULAR RATE: 83 BPM
WBC # BLD AUTO: 10.36 THOUSAND/UL (ref 4.31–10.16)

## 2025-02-27 PROCEDURE — 85379 FIBRIN DEGRADATION QUANT: CPT

## 2025-02-27 PROCEDURE — 99285 EMERGENCY DEPT VISIT HI MDM: CPT

## 2025-02-27 PROCEDURE — 84484 ASSAY OF TROPONIN QUANT: CPT

## 2025-02-27 PROCEDURE — 93010 ELECTROCARDIOGRAM REPORT: CPT | Performed by: STUDENT IN AN ORGANIZED HEALTH CARE EDUCATION/TRAINING PROGRAM

## 2025-02-27 PROCEDURE — 80053 COMPREHEN METABOLIC PANEL: CPT

## 2025-02-27 PROCEDURE — 93005 ELECTROCARDIOGRAM TRACING: CPT

## 2025-02-27 PROCEDURE — 85025 COMPLETE CBC W/AUTO DIFF WBC: CPT

## 2025-02-27 PROCEDURE — 36415 COLL VENOUS BLD VENIPUNCTURE: CPT

## 2025-02-27 PROCEDURE — 83880 ASSAY OF NATRIURETIC PEPTIDE: CPT

## 2025-02-27 PROCEDURE — 71046 X-RAY EXAM CHEST 2 VIEWS: CPT

## 2025-02-27 PROCEDURE — 99284 EMERGENCY DEPT VISIT MOD MDM: CPT

## 2025-02-27 RX ORDER — LORATADINE 10 MG/1
10 TABLET ORAL DAILY
Qty: 30 TABLET | Refills: 0 | Status: SHIPPED | OUTPATIENT
Start: 2025-02-27

## 2025-02-27 NOTE — Clinical Note
Julia Iraheta was seen and treated in our emergency department on 2/27/2025.                Diagnosis:     Julia  may return to work on return date.    She may return on this date: 02/28/2025         If you have any questions or concerns, please don't hesitate to call.      Cinthia Ortega PA-C    ______________________________           _______________          _______________  Hospital Representative                              Date                                Time

## 2025-02-27 NOTE — ED PROVIDER NOTES
Time reflects when diagnosis was documented in both MDM as applicable and the Disposition within this note       Time User Action Codes Description Comment    2/27/2025  5:31 PM RishiCinthia garcia Add [R06.02] Shortness of breath           ED Disposition       ED Disposition   Discharge    Condition   Stable    Date/Time   u Feb 27, 2025  5:31 PM    Comment   Julia Iraheta discharge to home/self care.                   Assessment & Plan       Medical Decision Making  Patient is a 29-year-old female presenting with worsening shortness of breath over the past month with upper back pain.  She is hypertensive on arrival but remaining vitals are within normal limits.  She is in no acute distress.  Blood pressure improved without intervention while in the ED.    DDx: Asthma, ACS, pneumonia, PE, CHF, anemia, musculoskeletal.  Plan: CBC, CMP, troponin/EKG, D-dimer, CXR vs CTA.    Mild leukocytosis at 10.34.  No anemia, electrolyte disturbances, DEIDRA, elevated LFTs.  BNP is normal.  Troponin is less than 2 with normal EKG, onset of symptoms current 3 hours ago and no further trending is indicated.  D-dimer is negative so doubt PE at this time.  Chest x-ray without acute findings as interpreted by myself.  Discussed with patient.  Will start her on daily allergy medication since symptoms started shortly after she got her new dog.  Instructed to continue her daily inhaler and albuterol as needed.  Advised to follow-up with primary care doctor if symptoms persist.    I have discussed findings and plan for discharge with the patient/caregiver. Follow up with the appropriate providers including primary care physician was discussed. Return precautions discussed with patient/caregiver as outlined in AVS. Patient/caregiver verbally expressed understanding. Patient stable at time of discharge and ambulated out of the emergency department.       Amount and/or Complexity of Data Reviewed  Labs: ordered. Decision-making details  documented in ED Course.  Radiology: ordered and independent interpretation performed.    Risk  OTC drugs.        ED Course as of 02/27/25 1847   Thu Feb 27, 2025   1629 D-Dimer, Quant: <0.27   1629 WBC(!): 10.36   1629 hs TnI 0hr: <2  Normal EKG. Onset of symptoms >3hrs ago, no further trending indicated.   1629 BNP: 37   1707 Blood Pressure: 142/97  Improved without intervention.       Medications - No data to display    ED Risk Strat Scores                                                History of Present Illness       Chief Complaint   Patient presents with    Shortness of Breath     Pt reports SOB, history of asthma, reports feels worse with exertion, radiates to upper back, took all asthma medication today, no relief.       Past Medical History:   Diagnosis Date    Asthma       History reviewed. No pertinent surgical history.   History reviewed. No pertinent family history.   Social History     Tobacco Use    Smoking status: Never    Smokeless tobacco: Never   Substance Use Topics    Alcohol use: Never    Drug use: Yes     Types: Marijuana      E-Cigarette/Vaping      E-Cigarette/Vaping Substances      I have reviewed and agree with the history as documented.     Patient is a 29-year-old female with a history of asthma presenting for evaluation of shortness of breath.  Symptoms have been worsening for the past month.  She was placed on a steroid inhaler recently by PCP and uses her albuterol inhaler as needed but symptoms continue to worsen.  She has pain in the left upper back as well that worsens with coughing and deep breaths.  Denies leg swelling, palpitation, lightheadedness.  No fevers, chills, cough, URI symptoms, abdominal pain, nausea, vomiting, diarrhea.  She did get a dog a few weeks prior to onset of symptoms and is concerned she has an allergy.  No history of blood clots, recent surgeries, prolonged immobilization.  She has a Nexplanon.      Shortness of Breath  Associated symptoms: no abdominal  pain, no chest pain, no cough, no ear pain, no fever, no rash, no sore throat and no vomiting        Review of Systems   Constitutional:  Negative for chills and fever.   HENT:  Negative for congestion, ear pain and sore throat.    Eyes:  Negative for pain and visual disturbance.   Respiratory:  Positive for chest tightness and shortness of breath. Negative for cough.    Cardiovascular:  Negative for chest pain, palpitations and leg swelling.   Gastrointestinal:  Negative for abdominal pain, diarrhea, nausea and vomiting.   Genitourinary:  Negative for dysuria and hematuria.   Musculoskeletal:  Positive for back pain. Negative for arthralgias.   Skin:  Negative for color change and rash.   Neurological:  Negative for seizures and syncope.   All other systems reviewed and are negative.          Objective       ED Triage Vitals [02/27/25 1435]   Temperature Pulse Blood Pressure Respirations SpO2 Patient Position - Orthostatic VS   97.8 °F (36.6 °C) 99 (!) 203/102 19 97 % Sitting      Temp Source Heart Rate Source BP Location FiO2 (%) Pain Score    Oral Monitor Right arm -- --      Vitals      Date and Time Temp Pulse SpO2 Resp BP Pain Score FACES Pain Rating User   02/27/25 1703 -- 83 98 % 18 142/97 -- -- JR   02/27/25 1435 97.8 °F (36.6 °C) 99 97 % 19 203/102 -- -- NZ            Physical Exam  Vitals and nursing note reviewed.   Constitutional:       General: She is not in acute distress.     Appearance: Normal appearance. She is not toxic-appearing.   HENT:      Head: Normocephalic and atraumatic.      Right Ear: External ear normal.      Left Ear: External ear normal.      Nose: Nose normal.      Mouth/Throat:      Mouth: Mucous membranes are moist.   Eyes:      General: No scleral icterus.        Right eye: No discharge.         Left eye: No discharge.      Extraocular Movements: Extraocular movements intact.   Cardiovascular:      Rate and Rhythm: Normal rate and regular rhythm.      Pulses: Normal pulses.    Pulmonary:      Effort: Pulmonary effort is normal. No tachypnea, accessory muscle usage or respiratory distress.      Breath sounds: Normal breath sounds.   Abdominal:      Palpations: Abdomen is soft.      Tenderness: There is no abdominal tenderness.   Musculoskeletal:         General: No tenderness, deformity or signs of injury.      Cervical back: Normal range of motion and neck supple.      Comments: No tenderness to left upper back where pain is reported.   Skin:     General: Skin is dry.      Coloration: Skin is not jaundiced.      Findings: No erythema or rash.   Neurological:      General: No focal deficit present.      Mental Status: She is alert and oriented to person, place, and time. Mental status is at baseline.      Motor: No weakness.      Gait: Gait normal.   Psychiatric:         Mood and Affect: Mood normal.         Behavior: Behavior normal.         Thought Content: Thought content normal.         Results Reviewed       Procedure Component Value Units Date/Time    D-Dimer [[id number]]  (Normal) Collected: 02/27/25 1554    Lab Status: Final result Specimen: Blood from Arm, Left Updated: 02/27/25 1628     D-Dimer, Quant <0.27 ug/ml FEU     B-Type Natriuretic Peptide(BNP) [[id number]]  (Normal) Collected: 02/27/25 1554    Lab Status: Final result Specimen: Blood from Arm, Left Updated: 02/27/25 1626     BNP 37 pg/mL     HS Troponin 0hr (reflex protocol) [[id number]]  (Normal) Collected: 02/27/25 1554    Lab Status: Final result Specimen: Blood from Arm, Left Updated: 02/27/25 1625     hs TnI 0hr <2 ng/L     Comprehensive metabolic panel [[id number]]  (Abnormal) Collected: 02/27/25 1554    Lab Status: Final result Specimen: Blood from Arm, Left Updated: 02/27/25 1621     Sodium 138 mmol/L      Potassium 3.8 mmol/L      Chloride 106 mmol/L      CO2 26 mmol/L      ANION GAP 6 mmol/L      BUN 8 mg/dL      Creatinine 0.71 mg/dL      Glucose 154 mg/dL      Calcium 9.2 mg/dL      AST 15 U/L      ALT 23 U/L       Alkaline Phosphatase 48 U/L      Total Protein 7.4 g/dL      Albumin 4.3 g/dL      Total Bilirubin 0.57 mg/dL      eGFR 117 ml/min/1.73sq m     Narrative:      [employer] guidelines for Chronic Kidney Disease (CKD):     Stage 1 with normal or high GFR (GFR > 90 mL/min/1.73 square meters)    Stage 2 Mild CKD (GFR = 60-89 mL/min/1.73 square meters)    Stage 3A Moderate CKD (GFR = 45-59 mL/min/1.73 square meters)    Stage 3B Moderate CKD (GFR = 30-44 mL/min/1.73 square meters)    Stage 4 Severe CKD (GFR = 15-29 mL/min/1.73 square meters)    Stage 5 End Stage CKD (GFR <15 mL/min/1.73 square meters)  Note: GFR calculation is accurate only with a steady state creatinine    CBC and differential [[id number]]  (Abnormal) Collected: 02/27/25 1554    Lab Status: Final result Specimen: Blood from Arm, Left Updated: 02/27/25 1559     WBC 10.36 Thousand/uL      RBC 4.72 Million/uL      Hemoglobin 14.4 g/dL      Hematocrit 43.5 %      MCV 92 fL      MCH 30.5 pg      MCHC 33.1 g/dL      RDW 11.7 %      MPV 10.5 fL      Platelets 247 Thousands/uL      nRBC 0 /100 WBCs      Segmented % 68 %      Immature Grans % 0 %      Lymphocytes % 22 %      Monocytes % 4 %      Eosinophils Relative 5 %      Basophils Relative 1 %      Absolute Neutrophils 7.14 Thousands/µL      Absolute Immature Grans 0.03 Thousand/uL      Absolute Lymphocytes 2.24 Thousands/µL      Absolute Monocytes 0.38 Thousand/µL      Eosinophils Absolute 0.50 Thousand/µL      Basophils Absolute 0.07 Thousands/µL     POCT pregnancy, urine [[id number]]     Lab Status: No result             XR chest 2 views   ED Interpretation by Cinthia Ortega PA-C (02/27 1707)   No acute pathology as interpreted by myself.        Final Interpretation by Aman Valencia MD (02/27 1840)      No acute cardiopulmonary disease.            Workstation performed: [id number]             ECG 12 Lead Documentation Only    Date/Time: 2/27/2025 4:26 PM    Performed  by: Cinthia Ortega PA-C  Authorized by: Cinthia Ortega PA-C    Indications / Diagnosis:  Sob  ECG reviewed by me, the ED Provider: yes    Patient location:  ED  Previous ECG:     Previous ECG:  Unavailable  Interpretation:     Interpretation: normal    Rate:     ECG rate:  83    ECG rate assessment: normal    Rhythm:     Rhythm: sinus rhythm    Ectopy:     Ectopy: none    QRS:     QRS axis:  Normal    QRS intervals:  Normal  Conduction:     Conduction: normal    ST segments:     ST segments:  Normal  T waves:     T waves: normal        ED Medication and Procedure Management   Prior to Admission Medications   Prescriptions Last Dose Informant Patient Reported? Taking?   albuterol (ACCUNEB) 0.63 MG/3ML nebulizer solution   Yes No   Sig: Inhale 1 ampule every 6 (six) hours   albuterol (Ventolin HFA) 90 mcg/act inhaler   No No   Sig: Inhale 2 puffs every 4 (four) hours as needed for shortness of breath   doxylamine (UNISON) 25 MG tablet   No No   Sig: Take 1 tablet (25 mg total) by mouth daily at bedtime as needed for nausea   fluticasone-salmeterol (Advair Diskus) 250-50 mcg/dose inhaler   No No   Sig: Inhale 1 puff every 12 (twelve) hours   ibuprofen (MOTRIN) 600 mg tablet   No No   Sig: Take 1 tablet (600 mg total) by mouth every 6 (six) hours as needed for moderate pain   loratadine (CLARITIN) 10 mg tablet   Yes No   Sig: Take 1 tablet by mouth   metoclopramide (REGLAN) 10 mg tablet   No No   Sig: Take 1 tablet (10 mg total) by mouth every 6 (six) hours as needed (nausea, vomiting)   montelukast (Singulair) 10 mg tablet   No No   Sig: Take 1 tablet (10 mg total) by mouth daily at bedtime   omeprazole (PriLOSEC) 40 MG capsule   Yes No   Sig: Take 40 mg by mouth   ondansetron (ZOFRAN-ODT) 4 mg disintegrating tablet   No No   Sig: Take 1 tablet (4 mg total) by mouth every 6 (six) hours as needed for nausea or vomiting   pyridoxine (B-6) 25 MG tablet   No No   Sig: Take 1 tablet (25 mg total) by  mouth daily at bedtime as needed (nausea/vomiting)      Facility-Administered Medications: None     Discharge Medication List as of 2/27/2025  5:33 PM        START taking these medications    Details   !! loratadine (CLARITIN) 10 mg tablet Take 1 tablet (10 mg total) by mouth daily, Starting Thu 2/27/2025, Normal       !! - Potential duplicate medications found. Please discuss with provider.        CONTINUE these medications which have NOT CHANGED    Details   albuterol (ACCUNEB) 0.63 MG/3ML nebulizer solution Inhale 1 ampule every 6 (six) hours, Historical Med      albuterol (Ventolin HFA) 90 mcg/act inhaler Inhale 2 puffs every 4 (four) hours as needed for shortness of breath, Starting Sat 8/7/2021, Normal      doxylamine (UNISON) 25 MG tablet Take 1 tablet (25 mg total) by mouth daily at bedtime as needed for nausea, Starting Mon 4/8/2019, Normal      fluticasone-salmeterol (Advair Diskus) 250-50 mcg/dose inhaler Inhale 1 puff every 12 (twelve) hours, Starting Sat 8/7/2021, Until Mon 9/6/2021, Normal      ibuprofen (MOTRIN) 600 mg tablet Take 1 tablet (600 mg total) by mouth every 6 (six) hours as needed for moderate pain, Starting Wed 8/4/2021, Print      !! loratadine (CLARITIN) 10 mg tablet Take 1 tablet by mouth, Starting Mon 3/17/2014, Historical Med      metoclopramide (REGLAN) 10 mg tablet Take 1 tablet (10 mg total) by mouth every 6 (six) hours as needed (nausea, vomiting), Starting Mon 4/8/2019, Normal      montelukast (Singulair) 10 mg tablet Take 1 tablet (10 mg total) by mouth daily at bedtime, Starting Sat 8/7/2021, Until Mon 9/6/2021, Normal      omeprazole (PriLOSEC) 40 MG capsule Take 40 mg by mouth, Historical Med      ondansetron (ZOFRAN-ODT) 4 mg disintegrating tablet Take 1 tablet (4 mg total) by mouth every 6 (six) hours as needed for nausea or vomiting, Starting Wed 8/4/2021, Print      pyridoxine (B-6) 25 MG tablet Take 1 tablet (25 mg total) by mouth daily at bedtime as needed  (nausea/vomiting), Starting Mon 4/8/2019, Print       !! - Potential duplicate medications found. Please discuss with provider.        No discharge procedures on file.  ED SEPSIS DOCUMENTATION   Time reflects when diagnosis was documented in both MDM as applicable and the Disposition within this note       Time User Action Codes Description Comment    2/27/2025  5:31 PM Cinthia Ortega Add [R06.02] Shortness of breath                  Cinthia Ortega PA-C  02/27/25 2482

## 2025-02-27 NOTE — DISCHARGE INSTRUCTIONS
Your workup today was reassuring. Start taking daily allergy medication. Continue using inhalers as prescribed. Follow up with primary care doctor if symptoms persist.

## 2025-05-05 ENCOUNTER — OFFICE (OUTPATIENT)
Dept: URBAN - METROPOLITAN AREA CLINIC 10 | Facility: CLINIC | Age: 30
End: 2025-05-05
Payer: COMMERCIAL

## 2025-05-05 VITALS
OXYGEN SATURATION: 98 % | HEIGHT: 67 IN | SYSTOLIC BLOOD PRESSURE: 127 MMHG | WEIGHT: 226 LBS | DIASTOLIC BLOOD PRESSURE: 77 MMHG | HEART RATE: 79 BPM

## 2025-05-05 DIAGNOSIS — R10.811 RIGHT UPPER QUADRANT ABDOMINAL TENDERNESS: ICD-10-CM

## 2025-05-05 DIAGNOSIS — R11.0 NAUSEA: ICD-10-CM

## 2025-05-05 DIAGNOSIS — R10.84 GENERALIZED ABDOMINAL PAIN: ICD-10-CM

## 2025-05-05 PROCEDURE — 99214 OFFICE O/P EST MOD 30 MIN: CPT

## 2025-05-05 RX ORDER — PANTOPRAZOLE SODIUM 40 MG/1
40 TABLET, DELAYED RELEASE ORAL
Qty: 30 | Refills: 6 | Status: ACTIVE
Start: 2025-05-05

## 2025-06-13 ENCOUNTER — OFFICE (OUTPATIENT)
Dept: URBAN - METROPOLITAN AREA CLINIC 10 | Facility: CLINIC | Age: 30
End: 2025-06-13
Payer: COMMERCIAL

## 2025-06-13 VITALS
HEART RATE: 82 BPM | DIASTOLIC BLOOD PRESSURE: 84 MMHG | OXYGEN SATURATION: 98 % | SYSTOLIC BLOOD PRESSURE: 138 MMHG | HEIGHT: 67 IN | WEIGHT: 234 LBS

## 2025-06-13 DIAGNOSIS — R10.84 GENERALIZED ABDOMINAL PAIN: ICD-10-CM

## 2025-06-13 DIAGNOSIS — R11.0 NAUSEA: ICD-10-CM

## 2025-06-13 DIAGNOSIS — K82.8 OTHER SPECIFIED DISEASES OF GALLBLADDER: ICD-10-CM

## 2025-06-13 DIAGNOSIS — R10.811 RIGHT UPPER QUADRANT ABDOMINAL TENDERNESS: ICD-10-CM

## 2025-06-13 PROCEDURE — 99214 OFFICE O/P EST MOD 30 MIN: CPT
